# Patient Record
Sex: FEMALE | Race: WHITE | NOT HISPANIC OR LATINO | Employment: OTHER | URBAN - METROPOLITAN AREA
[De-identification: names, ages, dates, MRNs, and addresses within clinical notes are randomized per-mention and may not be internally consistent; named-entity substitution may affect disease eponyms.]

---

## 2023-04-14 DIAGNOSIS — I10 HYPERTENSION, UNSPECIFIED TYPE: Primary | ICD-10-CM

## 2023-04-14 RX ORDER — OLMESARTAN MEDOXOMIL 20 MG/1
TABLET ORAL
Qty: 30 TABLET | Refills: 1 | Status: SHIPPED
Start: 2023-04-14 | End: 2023-05-16

## 2023-05-11 ENCOUNTER — OFFICE VISIT (OUTPATIENT)
Dept: PRIMARY CARE | Facility: CLINIC | Age: 63
End: 2023-05-11
Payer: MEDICARE

## 2023-05-11 VITALS
SYSTOLIC BLOOD PRESSURE: 128 MMHG | TEMPERATURE: 97.8 F | OXYGEN SATURATION: 98 % | BODY MASS INDEX: 33.79 KG/M2 | WEIGHT: 173 LBS | DIASTOLIC BLOOD PRESSURE: 72 MMHG | HEART RATE: 76 BPM

## 2023-05-11 DIAGNOSIS — I10 ESSENTIAL HYPERTENSION: ICD-10-CM

## 2023-05-11 DIAGNOSIS — M25.561 ACUTE PAIN OF RIGHT KNEE: ICD-10-CM

## 2023-05-11 DIAGNOSIS — E11.9 TYPE 2 DIABETES MELLITUS WITHOUT COMPLICATION, WITHOUT LONG-TERM CURRENT USE OF INSULIN (MULTI): Primary | ICD-10-CM

## 2023-05-11 DIAGNOSIS — F41.9 ANXIETY DISORDER, UNSPECIFIED TYPE: ICD-10-CM

## 2023-05-11 DIAGNOSIS — E55.9 VITAMIN D DEFICIENCY: ICD-10-CM

## 2023-05-11 PROBLEM — R11.0 NAUSEA: Status: ACTIVE | Noted: 2023-05-11

## 2023-05-11 PROBLEM — R42 VERTIGO: Status: ACTIVE | Noted: 2023-05-11

## 2023-05-11 PROBLEM — M54.2 NECK PAIN: Status: ACTIVE | Noted: 2023-05-11

## 2023-05-11 PROBLEM — L30.9 DERMATITIS: Status: ACTIVE | Noted: 2023-05-11

## 2023-05-11 PROBLEM — H52.03 HYPEROPIA OF BOTH EYES: Status: ACTIVE | Noted: 2023-05-11

## 2023-05-11 PROBLEM — N95.1 FEMALE CLIMACTERIC STATE: Status: ACTIVE | Noted: 2023-05-11

## 2023-05-11 PROBLEM — R53.83 FATIGUE: Status: ACTIVE | Noted: 2023-05-11

## 2023-05-11 PROBLEM — R10.2 PELVIC PAIN IN FEMALE: Status: ACTIVE | Noted: 2023-05-11

## 2023-05-11 PROBLEM — R00.2 PALPITATIONS: Status: ACTIVE | Noted: 2023-05-11

## 2023-05-11 PROBLEM — R35.0 URINATION FREQUENCY: Status: ACTIVE | Noted: 2023-05-11

## 2023-05-11 PROBLEM — H43.811 PVD (POSTERIOR VITREOUS DETACHMENT), RIGHT EYE: Status: ACTIVE | Noted: 2023-05-11

## 2023-05-11 PROBLEM — K62.5 RECTAL BLEEDING: Status: ACTIVE | Noted: 2023-05-11

## 2023-05-11 PROBLEM — R61 NIGHT SWEATS: Status: ACTIVE | Noted: 2023-05-11

## 2023-05-11 PROBLEM — R22.0 FACIAL SWELLING: Status: ACTIVE | Noted: 2023-05-11

## 2023-05-11 PROBLEM — K76.89 HEPATIC CYST: Status: ACTIVE | Noted: 2023-05-11

## 2023-05-11 PROBLEM — R93.429 ABNORMAL CT SCAN, KIDNEY: Status: ACTIVE | Noted: 2023-05-11

## 2023-05-11 PROBLEM — L23.7 POISON IVY DERMATITIS: Status: ACTIVE | Noted: 2023-05-11

## 2023-05-11 PROBLEM — M79.609 LIMB PAIN: Status: ACTIVE | Noted: 2023-05-11

## 2023-05-11 PROBLEM — H81.10 BPPV (BENIGN PAROXYSMAL POSITIONAL VERTIGO): Status: ACTIVE | Noted: 2023-05-11

## 2023-05-11 PROBLEM — J01.90 ACUTE SINUSITIS: Status: ACTIVE | Noted: 2023-05-11

## 2023-05-11 PROBLEM — H25.13 CATARACT, NUCLEAR SCLEROTIC, BOTH EYES: Status: ACTIVE | Noted: 2023-05-11

## 2023-05-11 PROBLEM — R42 DIZZINESS: Status: ACTIVE | Noted: 2023-05-11

## 2023-05-11 PROBLEM — M47.812 CERVICAL SPONDYLOSIS: Status: ACTIVE | Noted: 2023-05-11

## 2023-05-11 PROBLEM — E78.5 HYPERLIPIDEMIA: Status: ACTIVE | Noted: 2023-05-11

## 2023-05-11 PROBLEM — R10.32 ABDOMINAL PAIN, LLQ: Status: ACTIVE | Noted: 2023-05-11

## 2023-05-11 PROBLEM — B37.31 YEAST INFECTION OF THE VAGINA: Status: ACTIVE | Noted: 2023-05-11

## 2023-05-11 PROBLEM — M25.569 JOINT PAIN, KNEE: Status: ACTIVE | Noted: 2023-05-11

## 2023-05-11 PROBLEM — S13.9XXA NECK SPRAIN: Status: ACTIVE | Noted: 2023-05-11

## 2023-05-11 PROBLEM — R93.2 ABNORMAL CT OF LIVER: Status: ACTIVE | Noted: 2023-05-11

## 2023-05-11 PROBLEM — F32.A DEPRESSION: Status: ACTIVE | Noted: 2023-05-11

## 2023-05-11 PROCEDURE — 4010F ACE/ARB THERAPY RXD/TAKEN: CPT | Performed by: STUDENT IN AN ORGANIZED HEALTH CARE EDUCATION/TRAINING PROGRAM

## 2023-05-11 PROCEDURE — 3074F SYST BP LT 130 MM HG: CPT | Performed by: STUDENT IN AN ORGANIZED HEALTH CARE EDUCATION/TRAINING PROGRAM

## 2023-05-11 PROCEDURE — 3078F DIAST BP <80 MM HG: CPT | Performed by: STUDENT IN AN ORGANIZED HEALTH CARE EDUCATION/TRAINING PROGRAM

## 2023-05-11 PROCEDURE — 99214 OFFICE O/P EST MOD 30 MIN: CPT | Performed by: STUDENT IN AN ORGANIZED HEALTH CARE EDUCATION/TRAINING PROGRAM

## 2023-05-11 PROCEDURE — 1036F TOBACCO NON-USER: CPT | Performed by: STUDENT IN AN ORGANIZED HEALTH CARE EDUCATION/TRAINING PROGRAM

## 2023-05-11 PROCEDURE — 3052F HG A1C>EQUAL 8.0%<EQUAL 9.0%: CPT | Performed by: STUDENT IN AN ORGANIZED HEALTH CARE EDUCATION/TRAINING PROGRAM

## 2023-05-11 RX ORDER — HYDRALAZINE HYDROCHLORIDE 25 MG/1
TABLET, FILM COATED ORAL
COMMUNITY
Start: 2020-03-17

## 2023-05-11 RX ORDER — MICONAZOLE NITRATE 2 %
CREAM WITH APPLICATOR VAGINAL
COMMUNITY
Start: 2022-06-23

## 2023-05-11 RX ORDER — MECLIZINE HYDROCHLORIDE 25 MG/1
1 TABLET ORAL 2 TIMES DAILY PRN
COMMUNITY
Start: 2018-03-23 | End: 2023-07-25 | Stop reason: SDUPTHER

## 2023-05-11 RX ORDER — LISINOPRIL 20 MG/1
1 TABLET ORAL DAILY
COMMUNITY
End: 2023-05-11 | Stop reason: SINTOL

## 2023-05-11 RX ORDER — ATORVASTATIN CALCIUM 40 MG/1
1 TABLET, FILM COATED ORAL NIGHTLY
COMMUNITY
Start: 2022-05-03 | End: 2023-05-12 | Stop reason: SDUPTHER

## 2023-05-11 RX ORDER — ALPRAZOLAM 0.5 MG/1
1 TABLET ORAL 3 TIMES DAILY PRN
COMMUNITY
Start: 2013-08-22 | End: 2023-05-12 | Stop reason: SDUPTHER

## 2023-05-11 RX ORDER — AMLODIPINE BESYLATE 10 MG/1
1 TABLET ORAL DAILY
COMMUNITY
Start: 2016-06-15 | End: 2023-05-16 | Stop reason: SDUPTHER

## 2023-05-11 RX ORDER — BLOOD SUGAR DIAGNOSTIC
STRIP MISCELLANEOUS
COMMUNITY
Start: 2013-10-15

## 2023-05-11 RX ORDER — SCOLOPAMINE TRANSDERMAL SYSTEM 1 MG/1
PATCH, EXTENDED RELEASE TRANSDERMAL
COMMUNITY
Start: 2022-05-11 | End: 2023-12-01 | Stop reason: SDUPTHER

## 2023-05-11 RX ORDER — CITALOPRAM 20 MG/1
1 TABLET, FILM COATED ORAL DAILY
COMMUNITY
Start: 2013-07-11 | End: 2023-05-16 | Stop reason: SDUPTHER

## 2023-05-11 RX ORDER — TRIAMCINOLONE ACETONIDE 1 MG/G
OINTMENT TOPICAL 2 TIMES DAILY
COMMUNITY
Start: 2022-08-01

## 2023-05-11 RX ORDER — CAMPHOR
SPIRIT TOPICAL
COMMUNITY
Start: 2022-08-01

## 2023-05-11 RX ORDER — GLIPIZIDE 5 MG/1
1 TABLET ORAL 2 TIMES DAILY
COMMUNITY
Start: 2018-09-03 | End: 2023-05-12 | Stop reason: SDUPTHER

## 2023-05-11 RX ORDER — ERGOCALCIFEROL 1.25 MG/1
1 CAPSULE ORAL
COMMUNITY
Start: 2014-11-25 | End: 2023-05-12 | Stop reason: SDUPTHER

## 2023-05-11 RX ORDER — IBUPROFEN 600 MG/1
TABLET ORAL
COMMUNITY
End: 2023-05-11 | Stop reason: SINTOL

## 2023-05-11 RX ORDER — METFORMIN HYDROCHLORIDE 500 MG/1
1 TABLET ORAL 2 TIMES DAILY
COMMUNITY
Start: 2013-08-22 | End: 2023-05-12 | Stop reason: SDUPTHER

## 2023-05-11 RX ORDER — PREDNISONE 20 MG/1
40 TABLET ORAL DAILY
Qty: 10 TABLET | Refills: 0 | Status: SHIPPED | OUTPATIENT
Start: 2023-05-11 | End: 2023-05-16

## 2023-05-11 ASSESSMENT — PATIENT HEALTH QUESTIONNAIRE - PHQ9
SUM OF ALL RESPONSES TO PHQ9 QUESTIONS 1 AND 2: 0
1. LITTLE INTEREST OR PLEASURE IN DOING THINGS: NOT AT ALL
2. FEELING DOWN, DEPRESSED OR HOPELESS: NOT AT ALL

## 2023-05-11 ASSESSMENT — COLUMBIA-SUICIDE SEVERITY RATING SCALE - C-SSRS
6. HAVE YOU EVER DONE ANYTHING, STARTED TO DO ANYTHING, OR PREPARED TO DO ANYTHING TO END YOUR LIFE?: NO
2. HAVE YOU ACTUALLY HAD ANY THOUGHTS OF KILLING YOURSELF?: NO
1. IN THE PAST MONTH, HAVE YOU WISHED YOU WERE DEAD OR WISHED YOU COULD GO TO SLEEP AND NOT WAKE UP?: NO

## 2023-05-11 ASSESSMENT — ENCOUNTER SYMPTOMS
LOSS OF SENSATION IN FEET: 0
OCCASIONAL FEELINGS OF UNSTEADINESS: 0
DEPRESSION: 0

## 2023-05-11 NOTE — PROGRESS NOTES
HPI: 62-year-old female presenting for 3-month follow-up.     HTN  Tolerating current regimen well.  Is taking the olmesartan she was started on, but states she stopped taking the amlodipine, and blood pressure has been good, systolic 120-130.    DMII  Stable, above goal.  Started taking Jardiance, has been getting yeast infections.  Reports when she discontinues the medication though, her sugars do go up.    Anxiety  Stable on current regimen, needs refills.    Right knee pain  Has been going on for approximately 2 months, insidious onset, no trauma.  Pain with getting up from the chair mostly, mild swelling.  Has taken some Motrin, which does help.    Denies HA, changes in vision, chest pain, SOB/TAYLOR, palpitations, N/V/D/C, dysuria/hematuria, hematochezia/melena, paresthesias, LE edema.    12 point ROS reviewed and negative other than as stated in HPI    General: Alert, oriented, pleasant, in no acute distress  HEENT:    Head: normocephalic, atraumatic;    eyes: EOMI, no scleral icterus;   Neck: soft, supple, non-tender, no masses appreciated  CV: Heart with regular rate and rhythm, normal S1/S2, no murmurs  Lungs: CTAB without wheezing, rhonchi or rales; good respiratory effort, no increased work of breathing  Neuro: Cranial nerves grossly intact; alert and oriented  Psych: Appropriate mood and affect  MSK: Right knee borderline effusion, tenderness on the Pez anserine tendon    1. HTN  -At goal in office  -Continue olmesartan 20 mg daily    2. DMII  -Last A1c 8.2  -Currently on metformin 1000 mg twice daily, glipizide 5 mg daily, Jardiance 10 mg daily  -We will discontinue Jardiance secondary to chronic vaginal candidiasis, would like to start GLP-1, hopefully get off sulfonylurea at that point, pharmacist will reach out to work with insurance    3. Anxiety  -Refill alprazolam 0.5 mg 3 times daily as needed  -CSA signed    4. R knee pain  -High suspicion for Pez anserine bursitis, exercise PDF given  -Plain  film  -Short dose of prednisone    F/U 3 months    Chris D'Amico, DO

## 2023-05-12 ENCOUNTER — LAB (OUTPATIENT)
Dept: LAB | Facility: LAB | Age: 63
End: 2023-05-12
Payer: MEDICARE

## 2023-05-12 DIAGNOSIS — E78.00 HYPERCHOLESTEROLEMIA: ICD-10-CM

## 2023-05-12 DIAGNOSIS — F41.9 ANXIETY DISORDER, UNSPECIFIED TYPE: ICD-10-CM

## 2023-05-12 DIAGNOSIS — E11.9 TYPE 2 DIABETES MELLITUS WITHOUT COMPLICATION, WITHOUT LONG-TERM CURRENT USE OF INSULIN (MULTI): ICD-10-CM

## 2023-05-12 DIAGNOSIS — F41.9 ANXIETY: ICD-10-CM

## 2023-05-12 DIAGNOSIS — E55.9 VITAMIN D DEFICIENCY: ICD-10-CM

## 2023-05-12 DIAGNOSIS — M25.561 ACUTE PAIN OF RIGHT KNEE: ICD-10-CM

## 2023-05-12 DIAGNOSIS — E11.69 TYPE 2 DIABETES MELLITUS WITH OTHER SPECIFIED COMPLICATION, UNSPECIFIED WHETHER LONG TERM INSULIN USE (MULTI): ICD-10-CM

## 2023-05-12 DIAGNOSIS — I10 ESSENTIAL HYPERTENSION: ICD-10-CM

## 2023-05-12 LAB
ALANINE AMINOTRANSFERASE (SGPT) (U/L) IN SER/PLAS: 23 U/L (ref 7–45)
ALBUMIN (G/DL) IN SER/PLAS: 4.1 G/DL (ref 3.4–5)
ALKALINE PHOSPHATASE (U/L) IN SER/PLAS: 78 U/L (ref 33–136)
ANION GAP IN SER/PLAS: 14 MMOL/L (ref 10–20)
ASPARTATE AMINOTRANSFERASE (SGOT) (U/L) IN SER/PLAS: 13 U/L (ref 9–39)
BILIRUBIN TOTAL (MG/DL) IN SER/PLAS: 0.7 MG/DL (ref 0–1.2)
CALCIDIOL (25 OH VITAMIN D3) (NG/ML) IN SER/PLAS: 62 NG/ML
CALCIUM (MG/DL) IN SER/PLAS: 9.4 MG/DL (ref 8.6–10.6)
CARBON DIOXIDE, TOTAL (MMOL/L) IN SER/PLAS: 25 MMOL/L (ref 21–32)
CHLORIDE (MMOL/L) IN SER/PLAS: 107 MMOL/L (ref 98–107)
CHOLESTEROL (MG/DL) IN SER/PLAS: 157 MG/DL (ref 0–199)
CHOLESTEROL IN HDL (MG/DL) IN SER/PLAS: 42.5 MG/DL
CHOLESTEROL/HDL RATIO: 3.7
CREATININE (MG/DL) IN SER/PLAS: 0.57 MG/DL (ref 0.5–1.05)
ERYTHROCYTE DISTRIBUTION WIDTH (RATIO) BY AUTOMATED COUNT: 13.1 % (ref 11.5–14.5)
ERYTHROCYTE MEAN CORPUSCULAR HEMOGLOBIN CONCENTRATION (G/DL) BY AUTOMATED: 32.5 G/DL (ref 32–36)
ERYTHROCYTE MEAN CORPUSCULAR VOLUME (FL) BY AUTOMATED COUNT: 88 FL (ref 80–100)
ERYTHROCYTES (10*6/UL) IN BLOOD BY AUTOMATED COUNT: 4.84 X10E12/L (ref 4–5.2)
ESTIMATED AVERAGE GLUCOSE FOR HBA1C: 192 MG/DL
GFR FEMALE: >90 ML/MIN/1.73M2
GLUCOSE (MG/DL) IN SER/PLAS: 175 MG/DL (ref 74–99)
HEMATOCRIT (%) IN BLOOD BY AUTOMATED COUNT: 42.5 % (ref 36–46)
HEMOGLOBIN (G/DL) IN BLOOD: 13.8 G/DL (ref 12–16)
HEMOGLOBIN A1C/HEMOGLOBIN TOTAL IN BLOOD: 8.3 %
LDL: 79 MG/DL (ref 0–99)
LEUKOCYTES (10*3/UL) IN BLOOD BY AUTOMATED COUNT: 8.1 X10E9/L (ref 4.4–11.3)
NRBC (PER 100 WBCS) BY AUTOMATED COUNT: 0 /100 WBC (ref 0–0)
PLATELETS (10*3/UL) IN BLOOD AUTOMATED COUNT: 270 X10E9/L (ref 150–450)
POTASSIUM (MMOL/L) IN SER/PLAS: 4.9 MMOL/L (ref 3.5–5.3)
PROTEIN TOTAL: 6.7 G/DL (ref 6.4–8.2)
SODIUM (MMOL/L) IN SER/PLAS: 141 MMOL/L (ref 136–145)
THYROTROPIN (MIU/L) IN SER/PLAS BY DETECTION LIMIT <= 0.05 MIU/L: 1.52 MIU/L (ref 0.44–3.98)
TRIGLYCERIDE (MG/DL) IN SER/PLAS: 178 MG/DL (ref 0–149)
UREA NITROGEN (MG/DL) IN SER/PLAS: 18 MG/DL (ref 6–23)
VLDL: 36 MG/DL (ref 0–40)

## 2023-05-12 PROCEDURE — 84443 ASSAY THYROID STIM HORMONE: CPT

## 2023-05-12 PROCEDURE — 80053 COMPREHEN METABOLIC PANEL: CPT

## 2023-05-12 PROCEDURE — 36415 COLL VENOUS BLD VENIPUNCTURE: CPT

## 2023-05-12 PROCEDURE — 82306 VITAMIN D 25 HYDROXY: CPT

## 2023-05-12 PROCEDURE — 85027 COMPLETE CBC AUTOMATED: CPT

## 2023-05-12 PROCEDURE — 80061 LIPID PANEL: CPT

## 2023-05-12 PROCEDURE — 83036 HEMOGLOBIN GLYCOSYLATED A1C: CPT

## 2023-05-12 RX ORDER — ATORVASTATIN CALCIUM 40 MG/1
40 TABLET, FILM COATED ORAL NIGHTLY
Qty: 90 TABLET | Refills: 1 | Status: SHIPPED
Start: 2023-05-12 | End: 2024-02-22 | Stop reason: SDUPTHER

## 2023-05-12 RX ORDER — ALPRAZOLAM 0.5 MG/1
0.5 TABLET ORAL 3 TIMES DAILY PRN
Qty: 90 TABLET | Refills: 0 | Status: SHIPPED | OUTPATIENT
Start: 2023-05-12 | End: 2023-08-18 | Stop reason: SDUPTHER

## 2023-05-12 RX ORDER — ERGOCALCIFEROL 1.25 MG/1
1 CAPSULE ORAL
Qty: 12 CAPSULE | Refills: 1 | Status: SHIPPED
Start: 2023-05-12 | End: 2024-03-21 | Stop reason: SDUPTHER

## 2023-05-12 RX ORDER — GLIPIZIDE 5 MG/1
5 TABLET ORAL 2 TIMES DAILY
Qty: 180 TABLET | Refills: 1 | Status: SHIPPED
Start: 2023-05-12 | End: 2023-08-10 | Stop reason: ALTCHOICE

## 2023-05-12 RX ORDER — METFORMIN HYDROCHLORIDE 500 MG/1
500 TABLET ORAL 2 TIMES DAILY
Qty: 180 TABLET | Refills: 1 | Status: SHIPPED
Start: 2023-05-12 | End: 2024-04-24 | Stop reason: SDUPTHER

## 2023-05-12 NOTE — RESULT ENCOUNTER NOTE
X-ray shows arthritis in the knee with some swelling.  Medication given in office will help temporarily with bringing down the inflammation.  Likely formal physical therapy would be the next best treatment option.

## 2023-05-15 ENCOUNTER — TELEMEDICINE (OUTPATIENT)
Dept: PHARMACY | Facility: HOSPITAL | Age: 63
End: 2023-05-15

## 2023-05-15 DIAGNOSIS — E11.9 DIABETES MELLITUS TYPE 2 WITHOUT RETINOPATHY (MULTI): Primary | ICD-10-CM

## 2023-05-15 DIAGNOSIS — E11.9 DIABETES MELLITUS TYPE 2 WITHOUT RETINOPATHY (MULTI): ICD-10-CM

## 2023-05-15 RX ORDER — DULAGLUTIDE 0.75 MG/.5ML
0.75 INJECTION, SOLUTION SUBCUTANEOUS
Qty: 2 ML | Refills: 1 | Status: SHIPPED
Start: 2023-05-15 | End: 2023-06-08 | Stop reason: SDUPTHER

## 2023-05-15 NOTE — PROGRESS NOTES
Pharmacist Clinic: Diabetes Management  Keith Fregoso is a 62 y.o. female was referred to Clinical Pharmacy Team for diabetes management.     Referring Provider: Christopher D'Amico, DO     HISTORY OF PRESENT ILLNESS  Approximate Date of Diagnosis: approx 20 years ago  Diet: patient states she knows she does not eat healthy, she needs to work on it   Exercise Routine: minimal given her leg problems     LAB REVIEW   Glucose (mg/dL)   Date Value   05/12/2023 175 (H)   02/08/2023 150 (H)   11/08/2022 192 (H)     Hemoglobin A1C (%)   Date Value   05/12/2023 8.3 (A)   02/08/2023 8.2 (A)   11/08/2022 8.7 (A)     Bicarbonate (mmol/L)   Date Value   05/12/2023 25   02/08/2023 31   11/08/2022 28     Urea Nitrogen (mg/dL)   Date Value   05/12/2023 18   02/08/2023 18   11/08/2022 12     Creatinine (mg/dL)   Date Value   05/12/2023 0.57   02/08/2023 0.59   11/08/2022 0.54     Lab Results   Component Value Date    CHOL 157 05/12/2023    CHOL 165 02/08/2023    CHOL 183 11/08/2022     Lab Results   Component Value Date    HDL 42.5 05/12/2023    HDL 53.9 02/08/2023    HDL 49.4 11/08/2022     No results found for: LDLCALC  Lab Results   Component Value Date    TRIG 178 (H) 05/12/2023    TRIG 110 02/08/2023    TRIG 158 (H) 11/08/2022     DIABETES ASSESSMENT    CURRENT PHARMACOTHERAPY  - januvia 100 mg one daily   - metformin 500 mg twice daily   - glipizide 5 mg twice daily     SECONDARY PREVENTION  - Statin? Yes  - ACE-I/ARB? Yes, patient appears to be non-adherent     HISTORICAL PHARMACOTHERAPY  - SGLT2i: recurrent yeast infections     SMBG MEASUREMENTS: AM readings around 140-150     Patient does not report symptoms of hypoglycemia. Patient denies dizziness and sweating.  Patient does not report symptoms of hyperglycemia. Patient denies excessive thirst and excessive urination.    RECOMMENDATIONS/PLAN  1. Patients diabetes is poorly controlled with most recent A1c of 8.3 % (goal < 7 %).   - Continue all meds under the  continuation of care with the referring provider and clinical pharmacy team.  - Initiate trulicity 0.75 mg under the skin once weekly.   - Future consideration to discontinue januvia pending tolerability of trulicity.     2. Education: went over trulicity (MOA, side effects, administration, expectations)  - Patients daughter is a nurse who will help administer the first few injections    Clinical Pharmacist follow up: one week     Thank you,  Alejandra Espinal, PharmD    Verbal consent to manage patient's drug therapy was obtained from the patient. They were informed they may decline to participate or withdraw from participation in pharmacy services at any time.

## 2023-05-15 NOTE — RESULT ENCOUNTER NOTE
Triglycerides borderline elevated, likely secondary to elevated blood sugars.    Hemoglobin A1c at 8.3, stable from last few draws.  I think it will be very beneficial to start the weekly injectable medications, should continue to follow with pharmacist.    Remaining labs unremarkable.

## 2023-05-16 DIAGNOSIS — F41.9 ANXIETY: ICD-10-CM

## 2023-05-16 DIAGNOSIS — I15.9 SECONDARY HYPERTENSION: ICD-10-CM

## 2023-05-16 RX ORDER — CITALOPRAM 20 MG/1
20 TABLET, FILM COATED ORAL DAILY
Qty: 90 TABLET | Refills: 1 | Status: SHIPPED | OUTPATIENT
Start: 2023-05-16

## 2023-05-16 RX ORDER — AMLODIPINE BESYLATE 10 MG/1
10 TABLET ORAL DAILY
Qty: 90 TABLET | Refills: 1 | Status: SHIPPED
Start: 2023-05-16 | End: 2023-12-11 | Stop reason: SDUPTHER

## 2023-05-22 ENCOUNTER — APPOINTMENT (OUTPATIENT)
Dept: PHARMACY | Facility: HOSPITAL | Age: 63
End: 2023-05-22

## 2023-05-24 ENCOUNTER — TELEMEDICINE (OUTPATIENT)
Dept: PHARMACY | Facility: HOSPITAL | Age: 63
End: 2023-05-24

## 2023-05-24 DIAGNOSIS — E11.9 DIABETES MELLITUS TYPE 2 WITHOUT RETINOPATHY (MULTI): Primary | ICD-10-CM

## 2023-05-24 NOTE — PROGRESS NOTES
Pharmacist Clinic: Diabetes Management  Keith Fregoso is a 62 y.o. female was referred to Clinical Pharmacy Team for diabetes management. At last visit, trulicity was initiated.     Referring Provider: Christopher D'Amico, DO     HISTORY OF PRESENT ILLNESS  Approximate Date of Diagnosis: approx 20 years ago  Diet: patient states she knows she does not eat healthy, she needs to work on it   Exercise Routine: minimal given her leg problems     LAB REVIEW   Glucose (mg/dL)   Date Value   05/12/2023 175 (H)   02/08/2023 150 (H)   11/08/2022 192 (H)     Hemoglobin A1C (%)   Date Value   05/12/2023 8.3 (A)   02/08/2023 8.2 (A)   11/08/2022 8.7 (A)     Bicarbonate (mmol/L)   Date Value   05/12/2023 25   02/08/2023 31   11/08/2022 28     Urea Nitrogen (mg/dL)   Date Value   05/12/2023 18   02/08/2023 18   11/08/2022 12     Creatinine (mg/dL)   Date Value   05/12/2023 0.57   02/08/2023 0.59   11/08/2022 0.54     Lab Results   Component Value Date    CHOL 157 05/12/2023    CHOL 165 02/08/2023    CHOL 183 11/08/2022     Lab Results   Component Value Date    HDL 42.5 05/12/2023    HDL 53.9 02/08/2023    HDL 49.4 11/08/2022     No results found for: LDLCALC  Lab Results   Component Value Date    TRIG 178 (H) 05/12/2023    TRIG 110 02/08/2023    TRIG 158 (H) 11/08/2022     DIABETES ASSESSMENT    CURRENT PHARMACOTHERAPY  - januvia 100 mg one daily   - metformin 500 mg twice daily   - glipizide 5 mg twice daily   - trulicity 0.75 mg under the skin once weekly, first dose given on 5/21    SECONDARY PREVENTION  - Statin? Yes  - ACE-I/ARB? Yes, patient appears to be non-adherent     HISTORICAL PHARMACOTHERAPY  - SGLT2i: recurrent yeast infections     SMBG MEASUREMENTS: AM readings around 140-150     Patient does not report symptoms of hypoglycemia. Patient denies dizziness and sweating.  Patient does not report symptoms of hyperglycemia. Patient denies excessive thirst and excessive urination.  Patient is tolerating the medication  without any side effects. She has noticed she has a decreased appetite on the medication.     RECOMMENDATIONS/PLAN  1. Patients diabetes is poorly controlled with most recent A1c of 8.3 % (goal < 7 %).   - Continue all meds under the continuation of care with the referring provider and clinical pharmacy team.  - Initiate trulicity 0.75 mg under the skin once weekly.   - Future consideration to discontinue januvia pending tolerability of trulicity.     Clinical Pharmacist follow up: three weeks    Thank you,  Alejandra Espinal, PharmD    Verbal consent to manage patient's drug therapy was obtained from the patient. They were informed they may decline to participate or withdraw from participation in pharmacy services at any time.

## 2023-06-08 DIAGNOSIS — E11.9 DIABETES MELLITUS TYPE 2 WITHOUT RETINOPATHY (MULTI): ICD-10-CM

## 2023-06-08 DIAGNOSIS — K21.9 GASTROESOPHAGEAL REFLUX DISEASE, UNSPECIFIED WHETHER ESOPHAGITIS PRESENT: ICD-10-CM

## 2023-06-08 RX ORDER — DULAGLUTIDE 0.75 MG/.5ML
0.75 INJECTION, SOLUTION SUBCUTANEOUS
Qty: 3 ML | Refills: 2 | Status: SHIPPED | OUTPATIENT
Start: 2023-06-08 | End: 2023-08-14 | Stop reason: ALTCHOICE

## 2023-06-08 RX ORDER — OLMESARTAN MEDOXOMIL 20 MG/1
20 TABLET ORAL DAILY
COMMUNITY
End: 2023-06-08 | Stop reason: SDUPTHER

## 2023-06-08 RX ORDER — OLMESARTAN MEDOXOMIL 20 MG/1
20 TABLET ORAL DAILY
Qty: 90 TABLET | Refills: 3 | Status: SHIPPED | OUTPATIENT
Start: 2023-06-08 | End: 2023-12-11 | Stop reason: SDUPTHER

## 2023-06-12 ENCOUNTER — TELEMEDICINE (OUTPATIENT)
Dept: PHARMACY | Facility: HOSPITAL | Age: 63
End: 2023-06-12

## 2023-06-12 DIAGNOSIS — E11.9 DIABETES MELLITUS TYPE 2 WITHOUT RETINOPATHY (MULTI): Primary | ICD-10-CM

## 2023-06-12 NOTE — PROGRESS NOTES
"Pharmacist Clinic: Diabetes Management  Keith Fregoso is a 62 y.o. female was referred to Clinical Pharmacy Team for diabetes management. At last visit, no medication changes were made.    Referring Provider: Christopher D'Amico, DO     HISTORY OF PRESENT ILLNESS  Approximate Date of Diagnosis: approx 20 years ago  Diet: patient states she knows she does not eat healthy, she needs to work on it   Exercise Routine: minimal given her leg problems     LAB REVIEW   Glucose (mg/dL)   Date Value   05/12/2023 175 (H)   02/08/2023 150 (H)   11/08/2022 192 (H)     Hemoglobin A1C (%)   Date Value   05/12/2023 8.3 (A)   02/08/2023 8.2 (A)   11/08/2022 8.7 (A)     Bicarbonate (mmol/L)   Date Value   05/12/2023 25   02/08/2023 31   11/08/2022 28     Urea Nitrogen (mg/dL)   Date Value   05/12/2023 18   02/08/2023 18   11/08/2022 12     Creatinine (mg/dL)   Date Value   05/12/2023 0.57   02/08/2023 0.59   11/08/2022 0.54     Lab Results   Component Value Date    CHOL 157 05/12/2023    CHOL 165 02/08/2023    CHOL 183 11/08/2022     Lab Results   Component Value Date    HDL 42.5 05/12/2023    HDL 53.9 02/08/2023    HDL 49.4 11/08/2022     No results found for: \"LDLCALC\"  Lab Results   Component Value Date    TRIG 178 (H) 05/12/2023    TRIG 110 02/08/2023    TRIG 158 (H) 11/08/2022     DIABETES ASSESSMENT    CURRENT PHARMACOTHERAPY  - januvia 100 mg one daily   - metformin 500 mg twice daily   - glipizide 5 mg twice daily   - trulicity 0.75 mg under the skin once weekly, first dose given on 5/21    SECONDARY PREVENTION  - Statin? Yes  - ACE-I/ARB? Yes, patient appears to be non-adherent     HISTORICAL PHARMACOTHERAPY  - SGLT2i: recurrent yeast infections     SMBG MEASUREMENTS: AM readings around 150-155    Patient is tolerating the medication without any side effects. She has noticed she has a decreased appetite on the medication. She states her insurance company sent her 3 months of medication, follow up monthly to ensure she is " not without meds. Plan to titrate to max tolerated dosage.     RECOMMENDATIONS/PLAN  1. Patients diabetes is poorly controlled with most recent A1c of 8.3 % (goal < 7 %).   - Continue all meds under the continuation of care with the referring provider and clinical pharmacy team. Discontinue januvia d/t duplicate MOA with trulicity.     Clinical Pharmacist follow up: one month    Thank you,  Alejandra Espinal, PharmD    Verbal consent to manage patient's drug therapy was obtained from the patient. They were informed they may decline to participate or withdraw from participation in pharmacy services at any time.

## 2023-06-28 ENCOUNTER — TELEMEDICINE (OUTPATIENT)
Dept: PHARMACY | Facility: HOSPITAL | Age: 63
End: 2023-06-28

## 2023-06-28 DIAGNOSIS — E11.9 DIABETES MELLITUS TYPE 2 WITHOUT RETINOPATHY (MULTI): Primary | ICD-10-CM

## 2023-06-28 NOTE — PROGRESS NOTES
"Pharmacist Clinic: Diabetes Management  Keith Fregoso is a 62 y.o. female was referred to Clinical Pharmacy Team for diabetes management. At last visit, januvia was discontinued.     Since last speaking, patient discontinued her glipizide. Her sister recently started a once weekly injection and stopped her CASTELLON, so she decided to do the same.     Referring Provider: Christopher D'Amico,      HISTORY OF PRESENT ILLNESS  Approximate Date of Diagnosis: approx 20 years ago  Diet: patient states she knows she does not eat healthy, she needs to work on it   Exercise Routine: minimal given her leg problems     LAB REVIEW   Glucose (mg/dL)   Date Value   05/12/2023 175 (H)   02/08/2023 150 (H)   11/08/2022 192 (H)     Hemoglobin A1C (%)   Date Value   05/12/2023 8.3 (A)   02/08/2023 8.2 (A)   11/08/2022 8.7 (A)     Bicarbonate (mmol/L)   Date Value   05/12/2023 25   02/08/2023 31   11/08/2022 28     Urea Nitrogen (mg/dL)   Date Value   05/12/2023 18   02/08/2023 18   11/08/2022 12     Creatinine (mg/dL)   Date Value   05/12/2023 0.57   02/08/2023 0.59   11/08/2022 0.54     Lab Results   Component Value Date    CHOL 157 05/12/2023    CHOL 165 02/08/2023    CHOL 183 11/08/2022     Lab Results   Component Value Date    HDL 42.5 05/12/2023    HDL 53.9 02/08/2023    HDL 49.4 11/08/2022     No results found for: \"LDLCALC\"  Lab Results   Component Value Date    TRIG 178 (H) 05/12/2023    TRIG 110 02/08/2023    TRIG 158 (H) 11/08/2022     DIABETES ASSESSMENT    CURRENT PHARMACOTHERAPY  - metformin 500 mg twice daily   - trulicity 0.75 mg under the skin once weekly (gives shot on sundays at 1:30, patient is stating she has done 2 shots)     SECONDARY PREVENTION  - Statin? Yes  - ACE-I/ARB? Yes, patient appears to be non-adherent     HISTORICAL PHARMACOTHERAPY  - SGLT2i: recurrent yeast infections     SMBG MEASUREMENTS: AM readings around 140-150s    Patient is tolerating the medication without any side effects. She was nauseas " once, thought she was going to throw up, but never threw up. She has 8 injections left.     RECOMMENDATIONS/PLAN  1. Patients diabetes is poorly controlled with most recent A1c of 8.3 % (goal < 7 %).   - Continue all meds under the continuation of care with the referring provider and clinical pharmacy team.   - Follow up in 2 weeks, consider doing 2 injections of 0.75 mg for 1.5 mg dose if BG remains elevated.    Clinical Pharmacist follow up: 2 weeks    Thank you,  Alejandra Espinal, PharmD    Verbal consent to manage patient's drug therapy was obtained from the patient. They were informed they may decline to participate or withdraw from participation in pharmacy services at any time.

## 2023-07-12 ENCOUNTER — APPOINTMENT (OUTPATIENT)
Dept: PHARMACY | Facility: HOSPITAL | Age: 63
End: 2023-07-12

## 2023-07-13 ENCOUNTER — TELEMEDICINE (OUTPATIENT)
Dept: PHARMACY | Facility: HOSPITAL | Age: 63
End: 2023-07-13

## 2023-07-13 DIAGNOSIS — E11.9 DIABETES MELLITUS TYPE 2 WITHOUT RETINOPATHY (MULTI): Primary | ICD-10-CM

## 2023-07-13 NOTE — PROGRESS NOTES
"Pharmacist Clinic: Diabetes Management  Keith Fregoso is a 62 y.o. female was referred to Clinical Pharmacy Team for diabetes management. At last visit, januvia was discontinued.     Since last speaking, patient discontinued her glipizide. Her sister recently started a once weekly injection and stopped her CASTELLON, so she decided to do the same.     Referring Provider: Christopher D'Amico,      HISTORY OF PRESENT ILLNESS  Approximate Date of Diagnosis: approx 20 years ago  Diet: patient states she knows she does not eat healthy, she needs to work on it   Exercise Routine: minimal given her leg problems     LAB REVIEW   Glucose (mg/dL)   Date Value   05/12/2023 175 (H)   02/08/2023 150 (H)   11/08/2022 192 (H)     Hemoglobin A1C (%)   Date Value   05/12/2023 8.3 (A)   02/08/2023 8.2 (A)   11/08/2022 8.7 (A)     Bicarbonate (mmol/L)   Date Value   05/12/2023 25   02/08/2023 31   11/08/2022 28     Urea Nitrogen (mg/dL)   Date Value   05/12/2023 18   02/08/2023 18   11/08/2022 12     Creatinine (mg/dL)   Date Value   05/12/2023 0.57   02/08/2023 0.59   11/08/2022 0.54     Lab Results   Component Value Date    CHOL 157 05/12/2023    CHOL 165 02/08/2023    CHOL 183 11/08/2022     Lab Results   Component Value Date    HDL 42.5 05/12/2023    HDL 53.9 02/08/2023    HDL 49.4 11/08/2022     No results found for: \"LDLCALC\"  Lab Results   Component Value Date    TRIG 178 (H) 05/12/2023    TRIG 110 02/08/2023    TRIG 158 (H) 11/08/2022     DIABETES ASSESSMENT    CURRENT PHARMACOTHERAPY  - metformin 500 mg twice daily   - trulicity 0.75 mg under the skin once weekly (gives shot on sundays at 1:30, patient is stating she has done 2 shots)     SECONDARY PREVENTION  - Statin? Yes  - ACE-I/ARB? Yes, patient appears to be non-adherent     HISTORICAL PHARMACOTHERAPY  - SGLT2i: recurrent yeast infections     SMBG MEASUREMENTS: AM readings around 140-150s    Patient is tolerating the medication without any side effects. Her insurance sent " her a 90 day supply of trulicity, she wants to use what she paid for before increasing the dose.     RECOMMENDATIONS/PLAN  1. Patients diabetes is poorly controlled with most recent A1c of 8.3 % (goal < 7 %).   - Continue all meds under the continuation of care with the referring provider and clinical pharmacy team. Follow up in 7 weeks to increase trulicity dose.     Clinical Pharmacist follow up: 7 weeks    Thank you,  Alejandra Espinal, PharmD    Verbal consent to manage patient's drug therapy was obtained from the patient. They were informed they may decline to participate or withdraw from participation in pharmacy services at any time.

## 2023-07-25 DIAGNOSIS — R42 VERTIGO: ICD-10-CM

## 2023-07-25 RX ORDER — MECLIZINE HYDROCHLORIDE 25 MG/1
25 TABLET ORAL 2 TIMES DAILY PRN
Qty: 30 TABLET | Refills: 2 | Status: SHIPPED | OUTPATIENT
Start: 2023-07-25 | End: 2023-09-14

## 2023-08-10 ENCOUNTER — APPOINTMENT (OUTPATIENT)
Dept: PRIMARY CARE | Facility: CLINIC | Age: 63
End: 2023-08-10
Payer: MEDICARE

## 2023-08-10 ENCOUNTER — OFFICE VISIT (OUTPATIENT)
Dept: PRIMARY CARE | Facility: CLINIC | Age: 63
End: 2023-08-10
Payer: MEDICARE

## 2023-08-10 VITALS
HEART RATE: 78 BPM | WEIGHT: 171 LBS | HEIGHT: 60 IN | SYSTOLIC BLOOD PRESSURE: 106 MMHG | DIASTOLIC BLOOD PRESSURE: 68 MMHG | BODY MASS INDEX: 33.57 KG/M2 | OXYGEN SATURATION: 96 %

## 2023-08-10 DIAGNOSIS — I10 ESSENTIAL HYPERTENSION: ICD-10-CM

## 2023-08-10 DIAGNOSIS — E11.9 TYPE 2 DIABETES MELLITUS WITHOUT COMPLICATION, WITHOUT LONG-TERM CURRENT USE OF INSULIN (MULTI): ICD-10-CM

## 2023-08-10 DIAGNOSIS — E55.9 VITAMIN D DEFICIENCY: Primary | ICD-10-CM

## 2023-08-10 DIAGNOSIS — M25.561 ACUTE PAIN OF RIGHT KNEE: ICD-10-CM

## 2023-08-10 DIAGNOSIS — F41.9 ANXIETY DISORDER, UNSPECIFIED TYPE: ICD-10-CM

## 2023-08-10 PROCEDURE — 1036F TOBACCO NON-USER: CPT | Performed by: STUDENT IN AN ORGANIZED HEALTH CARE EDUCATION/TRAINING PROGRAM

## 2023-08-10 PROCEDURE — 3074F SYST BP LT 130 MM HG: CPT | Performed by: STUDENT IN AN ORGANIZED HEALTH CARE EDUCATION/TRAINING PROGRAM

## 2023-08-10 PROCEDURE — 4010F ACE/ARB THERAPY RXD/TAKEN: CPT | Performed by: STUDENT IN AN ORGANIZED HEALTH CARE EDUCATION/TRAINING PROGRAM

## 2023-08-10 PROCEDURE — 3078F DIAST BP <80 MM HG: CPT | Performed by: STUDENT IN AN ORGANIZED HEALTH CARE EDUCATION/TRAINING PROGRAM

## 2023-08-10 PROCEDURE — 3052F HG A1C>EQUAL 8.0%<EQUAL 9.0%: CPT | Performed by: STUDENT IN AN ORGANIZED HEALTH CARE EDUCATION/TRAINING PROGRAM

## 2023-08-10 PROCEDURE — 99214 OFFICE O/P EST MOD 30 MIN: CPT | Performed by: STUDENT IN AN ORGANIZED HEALTH CARE EDUCATION/TRAINING PROGRAM

## 2023-08-10 ASSESSMENT — COLUMBIA-SUICIDE SEVERITY RATING SCALE - C-SSRS
1. IN THE PAST MONTH, HAVE YOU WISHED YOU WERE DEAD OR WISHED YOU COULD GO TO SLEEP AND NOT WAKE UP?: NO
6. HAVE YOU EVER DONE ANYTHING, STARTED TO DO ANYTHING, OR PREPARED TO DO ANYTHING TO END YOUR LIFE?: NO
2. HAVE YOU ACTUALLY HAD ANY THOUGHTS OF KILLING YOURSELF?: NO

## 2023-08-10 ASSESSMENT — ENCOUNTER SYMPTOMS
OCCASIONAL FEELINGS OF UNSTEADINESS: 0
LOSS OF SENSATION IN FEET: 0
DEPRESSION: 0

## 2023-08-10 NOTE — PROGRESS NOTES
62-year-old female presenting for 3-month follow-up on chronic conditions:    HTN  Stable, tolerating current regimen well.    DMII  Stable, tolerating current regimen well.  Following with [facility] pharmacist.    Anxiety  Stable on current regimen.    Right knee pain  Some improvement with home exercises and prednisone burst.  Still having clicking and pain with walking.    12 point ROS reviewed and negative other than as stated in HPI    General: Alert, oriented, pleasant, in no acute distress  HEENT:    Head: normocephalic, atraumatic;    eyes: EOMI, no scleral icterus;   Neck: soft, supple, non-tender, no masses appreciated  CV: Heart with regular rate and rhythm, normal S1/S2, no murmurs  Lungs: CTAB without wheezing, rhonchi or rales; good respiratory effort, no increased work of breathing  Neuro: Cranial nerves grossly intact; alert and oriented  Psych: Appropriate mood and affect  MSK: Right knee without effusion, antalgic gait, tenderness along the anteromedial aspect    1. HTN  -At goal in office  -Continue olmesartan 20 mg daily    2. DMII  -Last A1c 8 0.3; 05/2023  -Currently on metformin 1000 mg twice daily, Trulicity 0.75 mg weekly  -Continue to titrate up Trulicity and follow with [facility] pharmacist    3. Anxiety  -Continue citalopram 20 mg daily, alprazolam 0.5 mg 3 times daily as needed  -CSA up-to-date    4. R knee pain  -Plain film with moderate arthritis  -Physical therapy referral    5.  HLD  - Continue atorvastatin 40 mg daily  - Repeat lipid panel    F/U 3 months Medicare Chris D'Amico, DO

## 2023-08-11 ENCOUNTER — LAB (OUTPATIENT)
Dept: LAB | Facility: LAB | Age: 63
End: 2023-08-11
Payer: MEDICARE

## 2023-08-11 DIAGNOSIS — E11.9 TYPE 2 DIABETES MELLITUS WITHOUT COMPLICATION, WITHOUT LONG-TERM CURRENT USE OF INSULIN (MULTI): ICD-10-CM

## 2023-08-11 DIAGNOSIS — M25.561 ACUTE PAIN OF RIGHT KNEE: ICD-10-CM

## 2023-08-11 DIAGNOSIS — F41.9 ANXIETY DISORDER, UNSPECIFIED TYPE: ICD-10-CM

## 2023-08-11 DIAGNOSIS — I10 ESSENTIAL HYPERTENSION: ICD-10-CM

## 2023-08-11 DIAGNOSIS — E55.9 VITAMIN D DEFICIENCY: ICD-10-CM

## 2023-08-11 LAB
ALANINE AMINOTRANSFERASE (SGPT) (U/L) IN SER/PLAS: 24 U/L (ref 7–45)
ALBUMIN (G/DL) IN SER/PLAS: 4.1 G/DL (ref 3.4–5)
ALKALINE PHOSPHATASE (U/L) IN SER/PLAS: 72 U/L (ref 33–136)
ANION GAP IN SER/PLAS: 13 MMOL/L (ref 10–20)
ASPARTATE AMINOTRANSFERASE (SGOT) (U/L) IN SER/PLAS: 14 U/L (ref 9–39)
BILIRUBIN TOTAL (MG/DL) IN SER/PLAS: 0.7 MG/DL (ref 0–1.2)
CALCIDIOL (25 OH VITAMIN D3) (NG/ML) IN SER/PLAS: 65 NG/ML
CALCIUM (MG/DL) IN SER/PLAS: 9.4 MG/DL (ref 8.6–10.6)
CARBON DIOXIDE, TOTAL (MMOL/L) IN SER/PLAS: 28 MMOL/L (ref 21–32)
CHLORIDE (MMOL/L) IN SER/PLAS: 105 MMOL/L (ref 98–107)
CHOLESTEROL (MG/DL) IN SER/PLAS: 144 MG/DL (ref 0–199)
CHOLESTEROL IN HDL (MG/DL) IN SER/PLAS: 38.9 MG/DL
CHOLESTEROL/HDL RATIO: 3.7
CREATININE (MG/DL) IN SER/PLAS: 0.62 MG/DL (ref 0.5–1.05)
ERYTHROCYTE DISTRIBUTION WIDTH (RATIO) BY AUTOMATED COUNT: 12.2 % (ref 11.5–14.5)
ERYTHROCYTE MEAN CORPUSCULAR HEMOGLOBIN CONCENTRATION (G/DL) BY AUTOMATED: 33.1 G/DL (ref 32–36)
ERYTHROCYTE MEAN CORPUSCULAR VOLUME (FL) BY AUTOMATED COUNT: 89 FL (ref 80–100)
ERYTHROCYTES (10*6/UL) IN BLOOD BY AUTOMATED COUNT: 4.43 X10E12/L (ref 4–5.2)
ESTIMATED AVERAGE GLUCOSE FOR HBA1C: 180 MG/DL
GFR FEMALE: >90 ML/MIN/1.73M2
GLUCOSE (MG/DL) IN SER/PLAS: 146 MG/DL (ref 74–99)
HEMATOCRIT (%) IN BLOOD BY AUTOMATED COUNT: 39.3 % (ref 36–46)
HEMOGLOBIN (G/DL) IN BLOOD: 13 G/DL (ref 12–16)
HEMOGLOBIN A1C/HEMOGLOBIN TOTAL IN BLOOD: 7.9 %
LDL: 69 MG/DL (ref 0–99)
LEUKOCYTES (10*3/UL) IN BLOOD BY AUTOMATED COUNT: 7.1 X10E9/L (ref 4.4–11.3)
NRBC (PER 100 WBCS) BY AUTOMATED COUNT: 0 /100 WBC (ref 0–0)
PLATELETS (10*3/UL) IN BLOOD AUTOMATED COUNT: 251 X10E9/L (ref 150–450)
POTASSIUM (MMOL/L) IN SER/PLAS: 4.6 MMOL/L (ref 3.5–5.3)
PROTEIN TOTAL: 6.8 G/DL (ref 6.4–8.2)
SODIUM (MMOL/L) IN SER/PLAS: 141 MMOL/L (ref 136–145)
THYROTROPIN (MIU/L) IN SER/PLAS BY DETECTION LIMIT <= 0.05 MIU/L: 0.85 MIU/L (ref 0.44–3.98)
TRIGLYCERIDE (MG/DL) IN SER/PLAS: 179 MG/DL (ref 0–149)
UREA NITROGEN (MG/DL) IN SER/PLAS: 20 MG/DL (ref 6–23)
VLDL: 36 MG/DL (ref 0–40)

## 2023-08-11 PROCEDURE — 84443 ASSAY THYROID STIM HORMONE: CPT

## 2023-08-11 PROCEDURE — 82306 VITAMIN D 25 HYDROXY: CPT

## 2023-08-11 PROCEDURE — 85027 COMPLETE CBC AUTOMATED: CPT

## 2023-08-11 PROCEDURE — 36415 COLL VENOUS BLD VENIPUNCTURE: CPT

## 2023-08-11 PROCEDURE — 83036 HEMOGLOBIN GLYCOSYLATED A1C: CPT

## 2023-08-11 PROCEDURE — 80061 LIPID PANEL: CPT

## 2023-08-11 PROCEDURE — 80053 COMPREHEN METABOLIC PANEL: CPT

## 2023-08-14 ENCOUNTER — TELEMEDICINE (OUTPATIENT)
Dept: PHARMACY | Facility: HOSPITAL | Age: 63
End: 2023-08-14

## 2023-08-14 DIAGNOSIS — E11.9 DIABETES MELLITUS TYPE 2 WITHOUT RETINOPATHY (MULTI): Primary | ICD-10-CM

## 2023-08-14 RX ORDER — DULAGLUTIDE 1.5 MG/.5ML
1.5 INJECTION, SOLUTION SUBCUTANEOUS
Qty: 2 ML | Refills: 2 | Status: SHIPPED | OUTPATIENT
Start: 2023-08-14 | End: 2023-08-30 | Stop reason: SDUPTHER

## 2023-08-14 NOTE — RESULT ENCOUNTER NOTE
Hemoglobin A1c at 7.9, best labs she has had in the last couple years.  Still above goal, continue to follow with pharmacist and increase the medications we discussed.    HDL is low at 38.9, continue to improve diet and exercise.    Remaining labs mostly unremarkable.    You may have to call her son Cristopher, same last name, as there is a language barrier.  Patient has given permission to speak to him.

## 2023-08-14 NOTE — PROGRESS NOTES
"Pharmacist Clinic: Diabetes Management  Keith Fregoso is a 62 y.o. female was referred to Clinical Pharmacy Team for diabetes management. At last visit, no medication changes were made.     Referring Provider: Christopher D'Amico, DO     HISTORY OF PRESENT ILLNESS  Approximate Date of Diagnosis: approx 20 years ago  Diet: patient states she knows she does not eat healthy, she needs to work on it   Exercise Routine: minimal given her leg problems     LAB REVIEW   Glucose (mg/dL)   Date Value   08/11/2023 146 (H)   05/12/2023 175 (H)   02/08/2023 150 (H)     Hemoglobin A1C (%)   Date Value   08/11/2023 7.9 (A)   05/12/2023 8.3 (A)   02/08/2023 8.2 (A)     Bicarbonate (mmol/L)   Date Value   08/11/2023 28   05/12/2023 25   02/08/2023 31     Urea Nitrogen (mg/dL)   Date Value   08/11/2023 20   05/12/2023 18   02/08/2023 18     Creatinine (mg/dL)   Date Value   08/11/2023 0.62   05/12/2023 0.57   02/08/2023 0.59     Lab Results   Component Value Date    CHOL 144 08/11/2023    CHOL 157 05/12/2023    CHOL 165 02/08/2023     Lab Results   Component Value Date    HDL 38.9 (A) 08/11/2023    HDL 42.5 05/12/2023    HDL 53.9 02/08/2023     No results found for: \"LDLCALC\"  Lab Results   Component Value Date    TRIG 179 (H) 08/11/2023    TRIG 178 (H) 05/12/2023    TRIG 110 02/08/2023     DIABETES ASSESSMENT    CURRENT PHARMACOTHERAPY  - metformin 500 mg twice daily   - trulicity 0.75 mg under the skin once weekly (gives shot on sundays at 1:30)    SECONDARY PREVENTION  - Statin? Yes  - ACE-I/ARB? Yes, patient appears to be non-adherent     HISTORICAL PHARMACOTHERAPY  - SGLT2i: recurrent yeast infections     SMBG MEASUREMENTS: AM readings around 140-150s     Patient is tolerating trulicity without any issues, her A1c has come down to 7.9%, this is significant improvement and she should be very proud of herself. We want to see the A1c come down to 7%. She denies any side effects of the medication, and states she is eating not as " much as she used to.     RECOMMENDATIONS/PLAN  1. Patients diabetes is poorly controlled with most recent A1c of 8.3 % (goal < 7 %).   - Continue all meds under the continuation of care with the referring provider and clinical pharmacy team.   - Increase trulicity to 1.5 mg under the skin once weekly.     Clinical Pharmacist follow up: 1 month    Thank you,  Alejandra Espinal, PharmD    Verbal consent to manage patient's drug therapy was obtained from the patient. They were informed they may decline to participate or withdraw from participation in pharmacy services at any time.

## 2023-08-18 DIAGNOSIS — F41.9 ANXIETY: ICD-10-CM

## 2023-08-18 RX ORDER — ALPRAZOLAM 0.5 MG/1
0.5 TABLET ORAL 3 TIMES DAILY PRN
Qty: 90 TABLET | Refills: 0 | Status: SHIPPED
Start: 2023-08-18 | End: 2023-12-11 | Stop reason: SDUPTHER

## 2023-08-30 ENCOUNTER — TELEMEDICINE (OUTPATIENT)
Dept: PHARMACY | Facility: HOSPITAL | Age: 63
End: 2023-08-30

## 2023-08-30 DIAGNOSIS — E11.9 DIABETES MELLITUS TYPE 2 WITHOUT RETINOPATHY (MULTI): ICD-10-CM

## 2023-08-30 RX ORDER — DULAGLUTIDE 1.5 MG/.5ML
1.5 INJECTION, SOLUTION SUBCUTANEOUS
Qty: 2 ML | Refills: 2 | Status: SHIPPED | OUTPATIENT
Start: 2023-08-30 | End: 2023-08-30

## 2023-08-30 NOTE — PROGRESS NOTES
"Pharmacist Clinic: Diabetes Management  Keith Fregoso is a 62 y.o. female was referred to Clinical Pharmacy Team for diabetes management. At last visit, no medication changes were made, however patient hit the coverage gap. Patient brought in her income statements, we will get her enrolled in the  PAP.     Referring Provider: Christopher D'Amico,      HISTORY OF PRESENT ILLNESS  Approximate Date of Diagnosis: approx 20 years ago  Diet: patient states she knows she does not eat healthy, she needs to work on it   Exercise Routine: minimal given her leg problems     LAB REVIEW   Glucose (mg/dL)   Date Value   08/11/2023 146 (H)   05/12/2023 175 (H)   02/08/2023 150 (H)     Hemoglobin A1C (%)   Date Value   08/11/2023 7.9 (A)   05/12/2023 8.3 (A)   02/08/2023 8.2 (A)     Bicarbonate (mmol/L)   Date Value   08/11/2023 28   05/12/2023 25   02/08/2023 31     Urea Nitrogen (mg/dL)   Date Value   08/11/2023 20   05/12/2023 18   02/08/2023 18     Creatinine (mg/dL)   Date Value   08/11/2023 0.62   05/12/2023 0.57   02/08/2023 0.59     Lab Results   Component Value Date    CHOL 144 08/11/2023    CHOL 157 05/12/2023    CHOL 165 02/08/2023     Lab Results   Component Value Date    HDL 38.9 (A) 08/11/2023    HDL 42.5 05/12/2023    HDL 53.9 02/08/2023     No results found for: \"LDLCALC\"  Lab Results   Component Value Date    TRIG 179 (H) 08/11/2023    TRIG 178 (H) 05/12/2023    TRIG 110 02/08/2023     DIABETES ASSESSMENT    CURRENT PHARMACOTHERAPY  - metformin 500 mg twice daily   - trulicity 0.75 mg under the skin once weekly (gives shot on sundays at 1:30)    SECONDARY PREVENTION  - Statin? Yes  - ACE-I/ARB? Yes, patient appears to be non-adherent     HISTORICAL PHARMACOTHERAPY  - SGLT2i: recurrent yeast infections     SMBG MEASUREMENTS: AM readings around 140-150s    Patient is tolerating trulicity without any issues, her A1c has come down to 7.9%, this is significant improvement and she should be very proud of herself. " We want to see the A1c come down to 7%. She denies any side effects of the medication, and states she is eating not as much as she used to.     RECOMMENDATIONS/PLAN  1. Patients diabetes is poorly controlled with most recent A1c of 8.3 % (goal < 7 %).   - Continue all meds under the continuation of care with the referring provider and clinical pharmacy team.   - Increase trulicity to 1.5 mg under the skin once weekly.     Clinical Pharmacist follow up: 1 month    Thank you,  Alejandra Espinal, PharmD    Verbal consent to manage patient's drug therapy was obtained from the patient. They were informed they may decline to participate or withdraw from participation in pharmacy services at any time.

## 2023-09-13 ENCOUNTER — TELEMEDICINE (OUTPATIENT)
Dept: PHARMACY | Facility: HOSPITAL | Age: 63
End: 2023-09-13

## 2023-09-13 DIAGNOSIS — E11.9 DIABETES MELLITUS TYPE 2 WITHOUT RETINOPATHY (MULTI): Primary | ICD-10-CM

## 2023-09-13 NOTE — PROGRESS NOTES
"Pharmacist Clinic: Diabetes Management  Keith Fregoso is a 62 y.o. female was referred to Clinical Pharmacy Team for diabetes management. At last visit, we increased trulicity to 1.5 mg under the skin once weekly.     Referring Provider: Christopher D'Amico, DO     HISTORY OF PRESENT ILLNESS  Approximate Date of Diagnosis: approx 20 years ago  Diet: patient states she knows she does not eat healthy, she needs to work on it   Exercise Routine: minimal given her leg problems     LAB REVIEW   Glucose (mg/dL)   Date Value   08/11/2023 146 (H)   05/12/2023 175 (H)   02/08/2023 150 (H)     Hemoglobin A1C (%)   Date Value   08/11/2023 7.9 (A)   05/12/2023 8.3 (A)   02/08/2023 8.2 (A)     Bicarbonate (mmol/L)   Date Value   08/11/2023 28   05/12/2023 25   02/08/2023 31     Urea Nitrogen (mg/dL)   Date Value   08/11/2023 20   05/12/2023 18   02/08/2023 18     Creatinine (mg/dL)   Date Value   08/11/2023 0.62   05/12/2023 0.57   02/08/2023 0.59     Lab Results   Component Value Date    CHOL 144 08/11/2023    CHOL 157 05/12/2023    CHOL 165 02/08/2023     Lab Results   Component Value Date    HDL 38.9 (A) 08/11/2023    HDL 42.5 05/12/2023    HDL 53.9 02/08/2023     No results found for: \"LDLCALC\"  Lab Results   Component Value Date    TRIG 179 (H) 08/11/2023    TRIG 178 (H) 05/12/2023    TRIG 110 02/08/2023     DIABETES ASSESSMENT    CURRENT PHARMACOTHERAPY  - metformin 500 mg twice daily   - trulicity 1.5 mg under the skin once weekly    SECONDARY PREVENTION  - Statin? Yes  - ACE-I/ARB? Yes, patient appears to be non-adherent     HISTORICAL PHARMACOTHERAPY  - SGLT2i: recurrent yeast infections     SMBG MEASUREMENTS: AM readings around 140-150s    Patient is tolerating the higher dose of trulicity without concern. She denies any side effects, she gave herself 1 injection thus far.   Patient will continue to check her BG, we will discuss dose increase in 3 weeks.     RECOMMENDATIONS/PLAN  1. Patients diabetes is poorly " controlled with most recent A1c of 8.3 % (goal < 7 %).   - Continue all meds under the continuation of care with the referring provider and clinical pharmacy team    Clinical Pharmacist follow up: 1 month    Thank you,  Alejandra Espinal, Zeeshan    Verbal consent to manage patient's drug therapy was obtained from the patient. They were informed they may decline to participate or withdraw from participation in pharmacy services at any time.

## 2023-09-14 ENCOUNTER — APPOINTMENT (OUTPATIENT)
Dept: PHARMACY | Facility: HOSPITAL | Age: 63
End: 2023-09-14

## 2023-09-14 DIAGNOSIS — R42 VERTIGO: ICD-10-CM

## 2023-09-14 RX ORDER — MECLIZINE HYDROCHLORIDE 25 MG/1
TABLET ORAL
Qty: 30 TABLET | Refills: 2 | Status: SHIPPED | OUTPATIENT
Start: 2023-09-14 | End: 2023-11-15 | Stop reason: SDUPTHER

## 2023-09-28 PROBLEM — M54.2 NECK PAIN: Status: RESOLVED | Noted: 2023-05-11 | Resolved: 2023-09-28

## 2023-09-28 PROBLEM — S13.9XXA NECK SPRAIN: Status: RESOLVED | Noted: 2023-05-11 | Resolved: 2023-09-28

## 2023-09-28 PROBLEM — R22.0 FACIAL SWELLING: Status: RESOLVED | Noted: 2023-05-11 | Resolved: 2023-09-28

## 2023-09-28 PROBLEM — B37.31 YEAST INFECTION OF THE VAGINA: Status: RESOLVED | Noted: 2023-05-11 | Resolved: 2023-09-28

## 2023-09-28 PROBLEM — E11.9 DIABETES MELLITUS (MULTI): Status: RESOLVED | Noted: 2023-05-11 | Resolved: 2023-09-28

## 2023-09-28 PROBLEM — E66.9 CLASS 1 OBESITY: Status: ACTIVE | Noted: 2023-09-28

## 2023-09-28 PROBLEM — R61 NIGHT SWEATS: Status: RESOLVED | Noted: 2023-05-11 | Resolved: 2023-09-28

## 2023-09-28 PROBLEM — R10.32 ABDOMINAL PAIN, LLQ: Status: RESOLVED | Noted: 2023-05-11 | Resolved: 2023-09-28

## 2023-09-28 PROBLEM — R10.2 PELVIC PAIN IN FEMALE: Status: RESOLVED | Noted: 2023-05-11 | Resolved: 2023-09-28

## 2023-09-28 PROBLEM — M25.569 JOINT PAIN, KNEE: Status: RESOLVED | Noted: 2023-05-11 | Resolved: 2023-09-28

## 2023-09-28 PROBLEM — R42 DIZZINESS: Status: RESOLVED | Noted: 2023-05-11 | Resolved: 2023-09-28

## 2023-09-28 PROBLEM — J01.90 ACUTE SINUSITIS: Status: RESOLVED | Noted: 2023-05-11 | Resolved: 2023-09-28

## 2023-09-28 PROBLEM — R11.0 NAUSEA: Status: RESOLVED | Noted: 2023-05-11 | Resolved: 2023-09-28

## 2023-09-28 PROBLEM — E66.811 CLASS 1 OBESITY: Status: ACTIVE | Noted: 2023-09-28

## 2023-09-28 PROBLEM — Z87.898 H/O ABDOMINAL ABSCESS: Status: ACTIVE | Noted: 2023-09-28

## 2023-09-28 PROBLEM — M79.609 LIMB PAIN: Status: RESOLVED | Noted: 2023-05-11 | Resolved: 2023-09-28

## 2023-09-28 PROBLEM — R35.0 URINATION FREQUENCY: Status: RESOLVED | Noted: 2023-05-11 | Resolved: 2023-09-28

## 2023-09-28 RX ORDER — IBUPROFEN 600 MG/1
600 TABLET ORAL 2 TIMES DAILY
COMMUNITY

## 2023-09-28 RX ORDER — CLOTRIMAZOLE 1 %
CREAM (GRAM) TOPICAL 2 TIMES DAILY
COMMUNITY

## 2023-09-28 RX ORDER — GLIPIZIDE 5 MG/1
TABLET ORAL
COMMUNITY
Start: 2023-08-28

## 2023-09-28 RX ORDER — FLUCONAZOLE 150 MG/1
150 TABLET ORAL ONCE
COMMUNITY

## 2023-09-28 RX ORDER — TRIAMCINOLONE ACETONIDE 0.25 MG/G
1 CREAM TOPICAL 2 TIMES DAILY
COMMUNITY

## 2023-10-06 ENCOUNTER — PHARMACY VISIT (OUTPATIENT)
Dept: PHARMACY | Facility: CLINIC | Age: 63
End: 2023-10-06
Payer: MEDICARE

## 2023-10-06 ENCOUNTER — TELEMEDICINE CLINICAL SUPPORT (OUTPATIENT)
Dept: PHARMACY | Facility: HOSPITAL | Age: 63
End: 2023-10-06

## 2023-10-06 DIAGNOSIS — E11.9 DIABETES MELLITUS TYPE 2 WITHOUT RETINOPATHY (MULTI): Primary | ICD-10-CM

## 2023-10-06 PROCEDURE — RXMED WILLOW AMBULATORY MEDICATION CHARGE

## 2023-10-06 NOTE — PROGRESS NOTES
"Pharmacist Clinic: Diabetes Management  Keith Fregoso is a 62 y.o. female was referred to Clinical Pharmacy Team for diabetes management. At last visit, no medication changes were made.     Referring Provider: Christopher D'Amico, DO     HISTORY OF PRESENT ILLNESS  Approximate Date of Diagnosis: approx 20 years ago  Diet: patient states she knows she does not eat healthy, she needs to work on it   Exercise Routine: minimal given her leg problems     LAB REVIEW   Glucose (mg/dL)   Date Value   08/11/2023 146 (H)   05/12/2023 175 (H)   02/08/2023 150 (H)     Hemoglobin A1C (%)   Date Value   08/11/2023 7.9 (A)   05/12/2023 8.3 (A)   02/08/2023 8.2 (A)     Bicarbonate (mmol/L)   Date Value   08/11/2023 28   05/12/2023 25   02/08/2023 31     Urea Nitrogen (mg/dL)   Date Value   08/11/2023 20   05/12/2023 18   02/08/2023 18     Creatinine (mg/dL)   Date Value   08/11/2023 0.62   05/12/2023 0.57   02/08/2023 0.59     Lab Results   Component Value Date    CHOL 144 08/11/2023    CHOL 157 05/12/2023    CHOL 165 02/08/2023     Lab Results   Component Value Date    HDL 38.9 (A) 08/11/2023    HDL 42.5 05/12/2023    HDL 53.9 02/08/2023     No results found for: \"LDLCALC\"  Lab Results   Component Value Date    TRIG 179 (H) 08/11/2023    TRIG 178 (H) 05/12/2023    TRIG 110 02/08/2023     DIABETES ASSESSMENT    CURRENT PHARMACOTHERAPY  - metformin 500 mg twice daily   - trulicity 1.5 mg under the skin once weekly    SECONDARY PREVENTION  - Statin? Yes  - ACE-I/ARB? Yes, patient appears to be non-adherent     HISTORICAL PHARMACOTHERAPY  - SGLT2i: recurrent yeast infections     SMBG MEASUREMENTS: AM readings around 140-150s    Patient is tolerating the higher dose of trulicity without concern.   Patient states when she tests her sugars on other peoples monitors, she sees lower numbers.   Discussed to write down sugar readings and document what monitor you used.   Discussed checking expiration date on test strips. "     RECOMMENDATIONS/PLAN  1. Patients diabetes is poorly controlled with most recent A1c of 8.3 % (goal < 7 %).   - Continue all meds under the continuation of care with the referring provider and clinical pharmacy team    Clinical Pharmacist follow up: 1 month, dosage increase discussion     Thank you,  Alejandra Espinal, PharmD    Verbal consent to manage patient's drug therapy was obtained from the patient. They were informed they may decline to participate or withdraw from participation in pharmacy services at any time.

## 2023-11-06 ENCOUNTER — PHARMACY VISIT (OUTPATIENT)
Dept: PHARMACY | Facility: CLINIC | Age: 63
End: 2023-11-06
Payer: MEDICARE

## 2023-11-06 ENCOUNTER — TELEMEDICINE (OUTPATIENT)
Dept: PHARMACY | Facility: HOSPITAL | Age: 63
End: 2023-11-06
Payer: MEDICARE

## 2023-11-06 DIAGNOSIS — E11.9 DIABETES MELLITUS TYPE 2 WITHOUT RETINOPATHY (MULTI): Primary | ICD-10-CM

## 2023-11-06 PROCEDURE — RXMED WILLOW AMBULATORY MEDICATION CHARGE

## 2023-11-06 RX ORDER — DULAGLUTIDE 3 MG/.5ML
3 INJECTION, SOLUTION SUBCUTANEOUS
Qty: 2 ML | Refills: 2 | Status: SHIPPED | OUTPATIENT
Start: 2023-11-06 | End: 2023-12-04 | Stop reason: DRUGHIGH

## 2023-11-06 NOTE — PROGRESS NOTES
"Pharmacist Clinic: Diabetes Management  Keith Fregoso is a 62 y.o. female was referred to Clinical Pharmacy Team for diabetes management. At last visit, no medication changes were made.     Referring Provider: Christopher D'Amico, DO     HISTORY OF PRESENT ILLNESS  Approximate Date of Diagnosis: approx 20 years ago  Diet: patient states she knows she does not eat healthy, she needs to work on it   Exercise Routine: minimal given her leg problems     LAB REVIEW   Glucose (mg/dL)   Date Value   08/11/2023 146 (H)   05/12/2023 175 (H)   02/08/2023 150 (H)     Hemoglobin A1C (%)   Date Value   08/11/2023 7.9 (A)   05/12/2023 8.3 (A)   02/08/2023 8.2 (A)     Bicarbonate (mmol/L)   Date Value   08/11/2023 28   05/12/2023 25   02/08/2023 31     Urea Nitrogen (mg/dL)   Date Value   08/11/2023 20   05/12/2023 18   02/08/2023 18     Creatinine (mg/dL)   Date Value   08/11/2023 0.62   05/12/2023 0.57   02/08/2023 0.59     Lab Results   Component Value Date    CHOL 144 08/11/2023    CHOL 157 05/12/2023    CHOL 165 02/08/2023     Lab Results   Component Value Date    HDL 38.9 (A) 08/11/2023    HDL 42.5 05/12/2023    HDL 53.9 02/08/2023     No results found for: \"LDLCALC\"  Lab Results   Component Value Date    TRIG 179 (H) 08/11/2023    TRIG 178 (H) 05/12/2023    TRIG 110 02/08/2023     DIABETES ASSESSMENT    CURRENT PHARMACOTHERAPY  - metformin 500 mg twice daily   - trulicity 1.5 mg under the skin once weekly    SECONDARY PREVENTION  - Statin? Yes  - ACE-I/ARB? Yes, patient appears to be non-adherent     HISTORICAL PHARMACOTHERAPY  - SGLT2i: recurrent yeast infections     SMBG MEASUREMENTS: AM readings around 140-150s    Patient is tolerating the higher dose of trulicity without concern.   Plan to increase her dose of trulicity as her morning numbers are still elevated.     RECOMMENDATIONS/PLAN  1. Patients diabetes is poorly controlled with most recent A1c of 8.3 % (goal < 7 %).   - Continue all meds under the continuation " of care with the referring provider and clinical pharmacy team  - Increase trulicity to 3 mg once weekly.     Clinical Pharmacist follow up: 1 month, dosage increase discussion     Thank you,  Alejandra Espinal, PharmD    Verbal consent to manage patient's drug therapy was obtained from the patient. They were informed they may decline to participate or withdraw from participation in pharmacy services at any time.

## 2023-11-15 DIAGNOSIS — R42 VERTIGO: ICD-10-CM

## 2023-11-15 RX ORDER — MECLIZINE HYDROCHLORIDE 25 MG/1
TABLET ORAL
Qty: 30 TABLET | Refills: 3 | Status: SHIPPED
Start: 2023-11-15 | End: 2024-01-24 | Stop reason: SDUPTHER

## 2023-11-29 ENCOUNTER — TELEMEDICINE (OUTPATIENT)
Dept: PHARMACY | Facility: HOSPITAL | Age: 63
End: 2023-11-29
Payer: MEDICARE

## 2023-11-29 NOTE — PROGRESS NOTES
"Pharmacist Clinic: Diabetes Management  Keith Fregoso is a 63 y.o. female was referred to Clinical Pharmacy Team for diabetes management. At last visit, no medication changes were made.     Referring Provider: Christopher D'Amico, DO     HISTORY OF PRESENT ILLNESS  Approximate Date of Diagnosis: approx 20 years ago  Diet: patient states she knows she does not eat healthy, she needs to work on it   Exercise Routine: minimal given her leg problems     LAB REVIEW   Glucose (mg/dL)   Date Value   08/11/2023 146 (H)   05/12/2023 175 (H)   02/08/2023 150 (H)     Hemoglobin A1C (%)   Date Value   08/11/2023 7.9 (A)   05/12/2023 8.3 (A)   02/08/2023 8.2 (A)     Bicarbonate (mmol/L)   Date Value   08/11/2023 28   05/12/2023 25   02/08/2023 31     Urea Nitrogen (mg/dL)   Date Value   08/11/2023 20   05/12/2023 18   02/08/2023 18     Creatinine (mg/dL)   Date Value   08/11/2023 0.62   05/12/2023 0.57   02/08/2023 0.59     Lab Results   Component Value Date    CHOL 144 08/11/2023    CHOL 157 05/12/2023    CHOL 165 02/08/2023     Lab Results   Component Value Date    HDL 38.9 (A) 08/11/2023    HDL 42.5 05/12/2023    HDL 53.9 02/08/2023     No results found for: \"LDLCALC\"  Lab Results   Component Value Date    TRIG 179 (H) 08/11/2023    TRIG 178 (H) 05/12/2023    TRIG 110 02/08/2023     DIABETES ASSESSMENT    CURRENT PHARMACOTHERAPY  - metformin 500 mg twice daily   - trulicity 3 mg under the skin once weekly    SECONDARY PREVENTION  - Statin? Yes  - ACE-I/ARB? Yes, patient appears to be non-adherent     HISTORICAL PHARMACOTHERAPY  - SGLT2i: recurrent yeast infections     SMBG MEASUREMENTS: AM readings around 140-150s    Patient is tolerating the higher dose of trulicity without concern.   Plan to increase her dose of trulicity as her morning numbers are still elevated.     RECOMMENDATIONS/PLAN  1. Patients diabetes is poorly controlled with most recent A1c of 7.8 % (goal < 7 %).   - Continue all meds under the continuation of " care with the referring provider and clinical pharmacy team  - Increase trulicity to 3 mg once weekly.     Clinical Pharmacist follow up: 1 month, dosage increase discussion     Thank you,  Alejandra Espinal, PharmD    Verbal consent to manage patient's drug therapy was obtained from the patient. They were informed they may decline to participate or withdraw from participation in pharmacy services at any time.

## 2023-12-01 ENCOUNTER — LAB (OUTPATIENT)
Dept: LAB | Facility: LAB | Age: 63
End: 2023-12-01
Payer: MEDICARE

## 2023-12-01 ENCOUNTER — OFFICE VISIT (OUTPATIENT)
Dept: PRIMARY CARE | Facility: CLINIC | Age: 63
End: 2023-12-01
Payer: MEDICARE

## 2023-12-01 VITALS
OXYGEN SATURATION: 94 % | SYSTOLIC BLOOD PRESSURE: 114 MMHG | HEART RATE: 78 BPM | WEIGHT: 171 LBS | HEIGHT: 60 IN | DIASTOLIC BLOOD PRESSURE: 71 MMHG | BODY MASS INDEX: 33.57 KG/M2

## 2023-12-01 DIAGNOSIS — F41.9 ANXIETY DISORDER, UNSPECIFIED TYPE: ICD-10-CM

## 2023-12-01 DIAGNOSIS — Z12.11 SCREENING FOR COLON CANCER: ICD-10-CM

## 2023-12-01 DIAGNOSIS — Z00.00 WELLNESS EXAMINATION: ICD-10-CM

## 2023-12-01 DIAGNOSIS — E55.9 VITAMIN D DEFICIENCY: ICD-10-CM

## 2023-12-01 DIAGNOSIS — Z00.00 MEDICARE ANNUAL WELLNESS VISIT, SUBSEQUENT: ICD-10-CM

## 2023-12-01 DIAGNOSIS — Z78.0 ASYMPTOMATIC MENOPAUSAL STATE: ICD-10-CM

## 2023-12-01 DIAGNOSIS — Z12.31 ENCOUNTER FOR SCREENING MAMMOGRAM FOR MALIGNANT NEOPLASM OF BREAST: ICD-10-CM

## 2023-12-01 DIAGNOSIS — E11.9 TYPE 2 DIABETES MELLITUS WITHOUT COMPLICATION, WITHOUT LONG-TERM CURRENT USE OF INSULIN (MULTI): Primary | ICD-10-CM

## 2023-12-01 DIAGNOSIS — E11.9 TYPE 2 DIABETES MELLITUS WITHOUT COMPLICATION, WITHOUT LONG-TERM CURRENT USE OF INSULIN (MULTI): ICD-10-CM

## 2023-12-01 DIAGNOSIS — M25.561 CHRONIC PAIN OF RIGHT KNEE: ICD-10-CM

## 2023-12-01 DIAGNOSIS — I10 ESSENTIAL HYPERTENSION: ICD-10-CM

## 2023-12-01 DIAGNOSIS — G89.29 CHRONIC PAIN OF RIGHT KNEE: ICD-10-CM

## 2023-12-01 DIAGNOSIS — T75.3XXD MOTION SICKNESS, SUBSEQUENT ENCOUNTER: ICD-10-CM

## 2023-12-01 LAB
25(OH)D3 SERPL-MCNC: 75 NG/ML (ref 30–100)
ALBUMIN SERPL BCP-MCNC: 4.6 G/DL (ref 3.4–5)
ALP SERPL-CCNC: 77 U/L (ref 33–136)
ALT SERPL W P-5'-P-CCNC: 35 U/L (ref 7–45)
ANION GAP SERPL CALC-SCNC: 14 MMOL/L (ref 10–20)
AST SERPL W P-5'-P-CCNC: 22 U/L (ref 9–39)
BILIRUB SERPL-MCNC: 0.7 MG/DL (ref 0–1.2)
BUN SERPL-MCNC: 18 MG/DL (ref 6–23)
CALCIUM SERPL-MCNC: 9.8 MG/DL (ref 8.6–10.6)
CHLORIDE SERPL-SCNC: 102 MMOL/L (ref 98–107)
CHOLEST SERPL-MCNC: 153 MG/DL (ref 0–199)
CHOLESTEROL/HDL RATIO: 3.4
CO2 SERPL-SCNC: 28 MMOL/L (ref 21–32)
CREAT SERPL-MCNC: 0.68 MG/DL (ref 0.5–1.05)
CREAT UR-MCNC: 100.4 MG/DL (ref 20–320)
ERYTHROCYTE [DISTWIDTH] IN BLOOD BY AUTOMATED COUNT: 12.3 % (ref 11.5–14.5)
EST. AVERAGE GLUCOSE BLD GHB EST-MCNC: 192 MG/DL
GFR SERPL CREATININE-BSD FRML MDRD: >90 ML/MIN/1.73M*2
GLUCOSE SERPL-MCNC: 124 MG/DL (ref 74–99)
HBA1C MFR BLD: 8.3 %
HCT VFR BLD AUTO: 41.7 % (ref 36–46)
HDLC SERPL-MCNC: 45.5 MG/DL
HGB BLD-MCNC: 13.3 G/DL (ref 12–16)
LDLC SERPL CALC-MCNC: 84 MG/DL
MCH RBC QN AUTO: 28.4 PG (ref 26–34)
MCHC RBC AUTO-ENTMCNC: 31.9 G/DL (ref 32–36)
MCV RBC AUTO: 89 FL (ref 80–100)
MICROALBUMIN UR-MCNC: 29.8 MG/L
MICROALBUMIN/CREAT UR: 29.7 UG/MG CREAT
NON HDL CHOLESTEROL: 108 MG/DL (ref 0–149)
NRBC BLD-RTO: 0 /100 WBCS (ref 0–0)
PLATELET # BLD AUTO: 296 X10*3/UL (ref 150–450)
POTASSIUM SERPL-SCNC: 4.8 MMOL/L (ref 3.5–5.3)
PROT SERPL-MCNC: 7.5 G/DL (ref 6.4–8.2)
RBC # BLD AUTO: 4.69 X10*6/UL (ref 4–5.2)
SODIUM SERPL-SCNC: 139 MMOL/L (ref 136–145)
TRIGL SERPL-MCNC: 120 MG/DL (ref 0–149)
TSH SERPL-ACNC: 1.01 MIU/L (ref 0.44–3.98)
VLDL: 24 MG/DL (ref 0–40)
WBC # BLD AUTO: 8.5 X10*3/UL (ref 4.4–11.3)

## 2023-12-01 PROCEDURE — 82043 UR ALBUMIN QUANTITATIVE: CPT

## 2023-12-01 PROCEDURE — G0009 ADMIN PNEUMOCOCCAL VACCINE: HCPCS | Performed by: STUDENT IN AN ORGANIZED HEALTH CARE EDUCATION/TRAINING PROGRAM

## 2023-12-01 PROCEDURE — 1036F TOBACCO NON-USER: CPT | Performed by: STUDENT IN AN ORGANIZED HEALTH CARE EDUCATION/TRAINING PROGRAM

## 2023-12-01 PROCEDURE — 83036 HEMOGLOBIN GLYCOSYLATED A1C: CPT

## 2023-12-01 PROCEDURE — 99214 OFFICE O/P EST MOD 30 MIN: CPT | Performed by: STUDENT IN AN ORGANIZED HEALTH CARE EDUCATION/TRAINING PROGRAM

## 2023-12-01 PROCEDURE — 80053 COMPREHEN METABOLIC PANEL: CPT

## 2023-12-01 PROCEDURE — 80061 LIPID PANEL: CPT

## 2023-12-01 PROCEDURE — 1170F FXNL STATUS ASSESSED: CPT | Performed by: STUDENT IN AN ORGANIZED HEALTH CARE EDUCATION/TRAINING PROGRAM

## 2023-12-01 PROCEDURE — 3074F SYST BP LT 130 MM HG: CPT | Performed by: STUDENT IN AN ORGANIZED HEALTH CARE EDUCATION/TRAINING PROGRAM

## 2023-12-01 PROCEDURE — 84443 ASSAY THYROID STIM HORMONE: CPT

## 2023-12-01 PROCEDURE — 1159F MED LIST DOCD IN RCRD: CPT | Performed by: STUDENT IN AN ORGANIZED HEALTH CARE EDUCATION/TRAINING PROGRAM

## 2023-12-01 PROCEDURE — G0008 ADMIN INFLUENZA VIRUS VAC: HCPCS | Performed by: STUDENT IN AN ORGANIZED HEALTH CARE EDUCATION/TRAINING PROGRAM

## 2023-12-01 PROCEDURE — 90662 IIV NO PRSV INCREASED AG IM: CPT | Performed by: STUDENT IN AN ORGANIZED HEALTH CARE EDUCATION/TRAINING PROGRAM

## 2023-12-01 PROCEDURE — 99397 PER PM REEVAL EST PAT 65+ YR: CPT | Performed by: STUDENT IN AN ORGANIZED HEALTH CARE EDUCATION/TRAINING PROGRAM

## 2023-12-01 PROCEDURE — 36415 COLL VENOUS BLD VENIPUNCTURE: CPT

## 2023-12-01 PROCEDURE — 85027 COMPLETE CBC AUTOMATED: CPT

## 2023-12-01 PROCEDURE — 1160F RVW MEDS BY RX/DR IN RCRD: CPT | Performed by: STUDENT IN AN ORGANIZED HEALTH CARE EDUCATION/TRAINING PROGRAM

## 2023-12-01 PROCEDURE — 82306 VITAMIN D 25 HYDROXY: CPT

## 2023-12-01 PROCEDURE — 3078F DIAST BP <80 MM HG: CPT | Performed by: STUDENT IN AN ORGANIZED HEALTH CARE EDUCATION/TRAINING PROGRAM

## 2023-12-01 PROCEDURE — 4010F ACE/ARB THERAPY RXD/TAKEN: CPT | Performed by: STUDENT IN AN ORGANIZED HEALTH CARE EDUCATION/TRAINING PROGRAM

## 2023-12-01 PROCEDURE — G0439 PPPS, SUBSEQ VISIT: HCPCS | Performed by: STUDENT IN AN ORGANIZED HEALTH CARE EDUCATION/TRAINING PROGRAM

## 2023-12-01 PROCEDURE — 3051F HG A1C>EQUAL 7.0%<8.0%: CPT | Performed by: STUDENT IN AN ORGANIZED HEALTH CARE EDUCATION/TRAINING PROGRAM

## 2023-12-01 PROCEDURE — 90677 PCV20 VACCINE IM: CPT | Performed by: STUDENT IN AN ORGANIZED HEALTH CARE EDUCATION/TRAINING PROGRAM

## 2023-12-01 PROCEDURE — 82570 ASSAY OF URINE CREATININE: CPT

## 2023-12-01 RX ORDER — SCOLOPAMINE TRANSDERMAL SYSTEM 1 MG/1
1 PATCH, EXTENDED RELEASE TRANSDERMAL
Qty: 30 PATCH | Refills: 0 | Status: SHIPPED | OUTPATIENT
Start: 2023-12-01 | End: 2024-02-29

## 2023-12-01 ASSESSMENT — ENCOUNTER SYMPTOMS
LOSS OF SENSATION IN FEET: 0
OCCASIONAL FEELINGS OF UNSTEADINESS: 0
DEPRESSION: 0

## 2023-12-01 ASSESSMENT — ACTIVITIES OF DAILY LIVING (ADL)
MANAGING_FINANCES: INDEPENDENT
GROCERY_SHOPPING: INDEPENDENT
BATHING: INDEPENDENT
DRESSING: INDEPENDENT
DOING_HOUSEWORK: INDEPENDENT
TAKING_MEDICATION: INDEPENDENT

## 2023-12-01 NOTE — LETTER
December 8, 2023     Keith Fregoso  [address] [place] [address]      Dear Ms. Fregoso:    Below are the results from your recent visit:      Hemoglobin A1c at 8.3, has gone up since last lab.  Continue to increase Trulicity with the pharmacist, can consider additional medication as needed.  Not meeting goal of A1c 7.0.     LDL at 84, slightly above diabetic goal of 70.  Continue to improve diet, continue with medication as prescribed.     Remaining labs mostly unremarkable.         If you have any questions or concerns, please don't hesitate to call.

## 2023-12-01 NOTE — LETTER
December 5, 2023     Keith Fregoso  [address] [place] [address]      Dear Ms. Fregoso:    Below are the results from your recent visit:      Hemoglobin A1c at 8.3, has gone up since last lab.  Continue to increase Trulicity with the pharmacist, can consider additional medication as needed.  Not meeting goal of A1c 7.0.     LDL at 84, slightly above diabetic goal of 70.  Continue to improve diet, continue with medication as prescribed.     Remaining labs mostly unremarkable.           If you have any questions or concerns, please don't hesitate to call.

## 2023-12-01 NOTE — PROGRESS NOTES
Subjective   Reason for Visit: Keith Fregoso is an 63 y.o. female here for a Medicare Wellness visit.          Reviewed all medications by prescribing practitioner or clinical pharmacist (such as prescriptions, OTCs, herbal therapies and supplements) and documented in the medical record.    HPI    Patient Care Team:  Christopher D'Amico, DO as PCP - General  Carlos Iverson DO as PCP - [facility] Attributed Provider     Review of Systems    Objective   Vitals:  /71   Pulse 78   Ht 1.524 m (5')   Wt 77.6 kg (171 lb)   SpO2 94%   BMI 33.40 kg/m²       Physical Exam    Assessment/Plan   Problem List Items Addressed This Visit    None           HPI: 63-year-old female presenting for follow-up on chronic conditions, Medicare annual wellness exam/CPE.    HTN  Stable, tolerating current regimen well.     DMII  Stable, tolerating current regimen well.  Following with [facility] pharmacist.     Anxiety  Stable on current regimen.     Right knee pain  Some improvement with home exercises and prednisone burst.  Still having clicking and pain with walking.  Did not do formal physical therapy    SocHx:   - Smoking: Never    Denies HA, changes in vision, chest pain, SOB/TAYLOR, palpitations, N/V/D/C, dysuria/hematuria, hematochezia/melena, paresthesias, LE edema.    12 point ROS reviewed and negative other than as stated in HPI      General: Alert, oriented, pleasant, in no acute distress  HEENT:      Head: normocephalic, atraumatic;      eyes: EOMI, no scleral icterus;   Neck: soft, supple, non-tender, no masses appreciated  CV: Heart with regular rate and rhythm, normal S1/S2, no murmurs  Lungs: CTAB without wheezing, rhonchi or rales; good respiratory effort, no increased work of breathing  Abdomen: soft, non-tender, non-distended, no masses appreciated  Extremities: no edema, no cyanosis  Neuro: Cranial nerves grossly intact; alert and oriented  Psych: Appropriate mood and affect    #HM  -CBC, CMP, Lipid panel, Vit D, TSH  with reflex T4  -Vaccines:       Flu: In office      Shingrix: Recommended, advised to go to local pharmacy      Pneumococcal: Prevnar 20 in office      Tdap: Recommended, advised to go to local pharmacy  -Sanam w/ angelita: Ordered  -Last PAP: Follow GYN  -Lung cancer screening with low-dose CT: never smoker  -Colonoscopy: Ordered  -Osteoporosis screening: Ordered     #HTN  -At goal in office  -Continue olmesartan 20 mg daily     #DMII  -Last A1c 7.9, 8/23  -UTD diabetic eye exam  - Diabetic foot exam negative in office  -Currently on metformin 1000 mg twice daily, Trulicity 3 mg weekly  -Continue to titrate up Trulicity and follow with [facility] pharmacist  -Repeat A1c, albumin    #Anxiety  -Continue citalopram 20 mg daily, alprazolam 0.5 mg 3 times daily as needed  -CSA up-to-date     #R knee pain  -Plain film with moderate arthritis  -Physical therapy referral, patient did not go  - Orthopedic referral     #HLD  - Continue atorvastatin 40 mg daily  - Repeat lipid panel    F/U 3 months, sooner if indicated    Chris D'Amico, DO

## 2023-12-04 ENCOUNTER — TELEMEDICINE (OUTPATIENT)
Dept: PHARMACY | Facility: HOSPITAL | Age: 63
End: 2023-12-04
Payer: MEDICARE

## 2023-12-04 ENCOUNTER — PHARMACY VISIT (OUTPATIENT)
Dept: PHARMACY | Facility: CLINIC | Age: 63
End: 2023-12-04
Payer: MEDICARE

## 2023-12-04 DIAGNOSIS — E11.9 DIABETES MELLITUS TYPE 2 WITHOUT RETINOPATHY (MULTI): Primary | ICD-10-CM

## 2023-12-04 PROCEDURE — RXMED WILLOW AMBULATORY MEDICATION CHARGE

## 2023-12-04 RX ORDER — DULAGLUTIDE 4.5 MG/.5ML
4.5 INJECTION, SOLUTION SUBCUTANEOUS
Qty: 2 ML | Refills: 2 | Status: SHIPPED | OUTPATIENT
Start: 2023-12-04 | End: 2024-02-22 | Stop reason: SDUPTHER

## 2023-12-04 NOTE — PROGRESS NOTES
Pharmacist Clinic: Diabetes Management  Keith Fregoso is a 63 y.o. female was referred to Clinical Pharmacy Team for diabetes management. At last visit, no medication changes were made.     Referring Provider: Christopher D'Amico, DO     HISTORY OF PRESENT ILLNESS  Approximate Date of Diagnosis: approx 20 years ago  Diet: patient states she knows she does not eat healthy, she needs to work on it   Exercise Routine: minimal given her leg problems     LAB REVIEW   Glucose (mg/dL)   Date Value   12/01/2023 124 (H)   08/11/2023 146 (H)   05/12/2023 175 (H)   02/08/2023 150 (H)     Hemoglobin A1C (%)   Date Value   12/01/2023 8.3 (H)   08/11/2023 7.9 (A)   05/12/2023 8.3 (A)   02/08/2023 8.2 (A)     Bicarbonate (mmol/L)   Date Value   12/01/2023 28   08/11/2023 28   05/12/2023 25   02/08/2023 31     Urea Nitrogen (mg/dL)   Date Value   12/01/2023 18   08/11/2023 20   05/12/2023 18   02/08/2023 18     Creatinine (mg/dL)   Date Value   12/01/2023 0.68   08/11/2023 0.62   05/12/2023 0.57   02/08/2023 0.59     Lab Results   Component Value Date    CHOL 153 12/01/2023    CHOL 144 08/11/2023    CHOL 157 05/12/2023     Lab Results   Component Value Date    HDL 45.5 12/01/2023    HDL 38.9 (A) 08/11/2023    HDL 42.5 05/12/2023     Lab Results   Component Value Date    LDLCALC 84 12/01/2023     Lab Results   Component Value Date    TRIG 120 12/01/2023    TRIG 179 (H) 08/11/2023    TRIG 178 (H) 05/12/2023     DIABETES ASSESSMENT    CURRENT PHARMACOTHERAPY  - metformin 500 mg twice daily   - trulicity 3 mg under the skin once weekly    SECONDARY PREVENTION  - Statin? Yes  - ACE-I/ARB? Yes, patient appears to be non-adherent     HISTORICAL PHARMACOTHERAPY  - SGLT2i: recurrent yeast infections     SMBG MEASUREMENTS: AM readings around 160-170s    DISCUSSION:  Your A1c came back up to 8.3%   Because of this we will increase your trulicity to 4.5 mg once weekly   Start working on eating healthier, at next A1c we will likely have to  add on another medication if we are still not at goal    RECOMMENDATIONS/PLAN  1. Patients diabetes is poorly controlled with most recent A1c of 7.8 % (goal < 7 %).   - Continue all meds under the continuation of care with the referring provider and clinical pharmacy team  - Increase trulicity to 4.5 mg once weekly.     Clinical Pharmacist follow up: 1 month    Thank you,  Alejandra Espinal, PharmD    Verbal consent to manage patient's drug therapy was obtained from the patient. They were informed they may decline to participate or withdraw from participation in pharmacy services at any time.

## 2023-12-05 ENCOUNTER — TELEPHONE (OUTPATIENT)
Dept: PRIMARY CARE | Facility: CLINIC | Age: 63
End: 2023-12-05
Payer: MEDICARE

## 2023-12-05 NOTE — RESULT ENCOUNTER NOTE
Hemoglobin A1c at 8.3, has gone up since last lab.  Continue to increase Trulicity with the pharmacist, can consider additional medication as needed.  Not meeting goal of A1c 7.0.    LDL at 84, slightly above diabetic goal of 70.  Continue to improve diet, continue with medication as prescribed.    Remaining labs mostly unremarkable.

## 2023-12-11 DIAGNOSIS — K21.9 GASTROESOPHAGEAL REFLUX DISEASE, UNSPECIFIED WHETHER ESOPHAGITIS PRESENT: ICD-10-CM

## 2023-12-11 DIAGNOSIS — F41.9 ANXIETY: ICD-10-CM

## 2023-12-11 DIAGNOSIS — I15.9 SECONDARY HYPERTENSION: ICD-10-CM

## 2023-12-11 RX ORDER — AMLODIPINE BESYLATE 10 MG/1
10 TABLET ORAL DAILY
Qty: 90 TABLET | Refills: 1 | Status: SHIPPED | OUTPATIENT
Start: 2023-12-11

## 2023-12-11 RX ORDER — ALPRAZOLAM 0.5 MG/1
0.5 TABLET ORAL 3 TIMES DAILY PRN
Qty: 90 TABLET | Refills: 0 | Status: SHIPPED | OUTPATIENT
Start: 2023-12-11 | End: 2024-03-21 | Stop reason: SDUPTHER

## 2023-12-12 RX ORDER — OLMESARTAN MEDOXOMIL 20 MG/1
20 TABLET ORAL DAILY
Qty: 90 TABLET | Refills: 3 | Status: SHIPPED | OUTPATIENT
Start: 2023-12-12 | End: 2024-03-21 | Stop reason: SDUPTHER

## 2023-12-18 ENCOUNTER — TELEMEDICINE (OUTPATIENT)
Dept: PHARMACY | Facility: HOSPITAL | Age: 63
End: 2023-12-18
Payer: MEDICARE

## 2023-12-18 DIAGNOSIS — E11.9 DIABETES MELLITUS TYPE 2 WITHOUT RETINOPATHY (MULTI): Primary | ICD-10-CM

## 2023-12-18 NOTE — PROGRESS NOTES
Pharmacist Clinic: Diabetes Management  Keith Fregoso is a 63 y.o. female was referred to Clinical Pharmacy Team for diabetes management. At last visit, no medication changes were made.     Referring Provider: Christopher D'Amico, DO     HISTORY OF PRESENT ILLNESS  Approximate Date of Diagnosis: approx 20 years ago  Diet: patient states she knows she does not eat healthy, she needs to work on it   Exercise Routine: minimal given her leg problems     LAB REVIEW   Glucose (mg/dL)   Date Value   12/01/2023 124 (H)   08/11/2023 146 (H)   05/12/2023 175 (H)   02/08/2023 150 (H)     Hemoglobin A1C (%)   Date Value   12/01/2023 8.3 (H)   08/11/2023 7.9 (A)   05/12/2023 8.3 (A)   02/08/2023 8.2 (A)     Bicarbonate (mmol/L)   Date Value   12/01/2023 28   08/11/2023 28   05/12/2023 25   02/08/2023 31     Urea Nitrogen (mg/dL)   Date Value   12/01/2023 18   08/11/2023 20   05/12/2023 18   02/08/2023 18     Creatinine (mg/dL)   Date Value   12/01/2023 0.68   08/11/2023 0.62   05/12/2023 0.57   02/08/2023 0.59     Lab Results   Component Value Date    CHOL 153 12/01/2023    CHOL 144 08/11/2023    CHOL 157 05/12/2023     Lab Results   Component Value Date    HDL 45.5 12/01/2023    HDL 38.9 (A) 08/11/2023    HDL 42.5 05/12/2023     Lab Results   Component Value Date    LDLCALC 84 12/01/2023     Lab Results   Component Value Date    TRIG 120 12/01/2023    TRIG 179 (H) 08/11/2023    TRIG 178 (H) 05/12/2023     DIABETES ASSESSMENT    CURRENT PHARMACOTHERAPY  - metformin 500 mg twice daily   - trulicity 4.5 mg under the skin once weekly    SECONDARY PREVENTION  - Statin? Yes  - ACE-I/ARB? Yes, patient appears to be non-adherent     HISTORICAL PHARMACOTHERAPY  - SGLT2i: recurrent yeast infections     SMBG MEASUREMENTS: AM readings around 140-150     DISCUSSION:  Patient is tolerating increased dose of trulicity (4.5 mg) she has given herself 2 injections and denies any new onset side effects    RECOMMENDATIONS/PLAN  1. Patients  diabetes is poorly controlled with most recent A1c of 7.8 % (goal < 7 %).   - Continue all meds under the continuation of care with the referring provider and clinical pharmacy team    Clinical Pharmacist follow up: 1 month    Thank you,  Alejandra Espinal, Zeeshan    Verbal consent to manage patient's drug therapy was obtained from the patient. They were informed they may decline to participate or withdraw from participation in pharmacy services at any time.

## 2024-01-03 PROCEDURE — RXMED WILLOW AMBULATORY MEDICATION CHARGE

## 2024-01-04 ENCOUNTER — PHARMACY VISIT (OUTPATIENT)
Dept: PHARMACY | Facility: CLINIC | Age: 64
End: 2024-01-04
Payer: MEDICARE

## 2024-01-18 ENCOUNTER — APPOINTMENT (OUTPATIENT)
Dept: PHARMACY | Facility: HOSPITAL | Age: 64
End: 2024-01-18
Payer: MEDICARE

## 2024-01-24 DIAGNOSIS — R42 VERTIGO: ICD-10-CM

## 2024-01-24 PROCEDURE — RXMED WILLOW AMBULATORY MEDICATION CHARGE

## 2024-01-24 RX ORDER — MECLIZINE HYDROCHLORIDE 25 MG/1
TABLET ORAL
Qty: 30 TABLET | Refills: 3 | Status: SHIPPED | OUTPATIENT
Start: 2024-01-24 | End: 2024-04-02

## 2024-01-25 ENCOUNTER — PHARMACY VISIT (OUTPATIENT)
Dept: PHARMACY | Facility: CLINIC | Age: 64
End: 2024-01-25
Payer: MEDICARE

## 2024-02-01 ENCOUNTER — TELEMEDICINE (OUTPATIENT)
Dept: PHARMACY | Facility: HOSPITAL | Age: 64
End: 2024-02-01
Payer: MEDICARE

## 2024-02-01 DIAGNOSIS — E11.9 DIABETES MELLITUS TYPE 2 WITHOUT RETINOPATHY (MULTI): Primary | ICD-10-CM

## 2024-02-01 RX ORDER — DAPAGLIFLOZIN 10 MG/1
10 TABLET, FILM COATED ORAL DAILY
Qty: 90 TABLET | Refills: 1 | Status: SHIPPED | OUTPATIENT
Start: 2024-02-01

## 2024-02-02 ENCOUNTER — PHARMACY VISIT (OUTPATIENT)
Dept: PHARMACY | Facility: CLINIC | Age: 64
End: 2024-02-02
Payer: MEDICARE

## 2024-02-02 PROCEDURE — RXMED WILLOW AMBULATORY MEDICATION CHARGE

## 2024-02-22 DIAGNOSIS — E11.9 DIABETES MELLITUS TYPE 2 WITHOUT RETINOPATHY (MULTI): ICD-10-CM

## 2024-02-22 DIAGNOSIS — E78.00 HYPERCHOLESTEROLEMIA: ICD-10-CM

## 2024-02-22 PROCEDURE — RXMED WILLOW AMBULATORY MEDICATION CHARGE

## 2024-02-22 RX ORDER — DULAGLUTIDE 4.5 MG/.5ML
4.5 INJECTION, SOLUTION SUBCUTANEOUS
Qty: 2 ML | Refills: 0 | Status: SHIPPED | OUTPATIENT
Start: 2024-02-22 | End: 2024-03-15 | Stop reason: SDUPTHER

## 2024-02-22 RX ORDER — ATORVASTATIN CALCIUM 40 MG/1
40 TABLET, FILM COATED ORAL NIGHTLY
Qty: 90 TABLET | Refills: 1 | Status: SHIPPED | OUTPATIENT
Start: 2024-02-22 | End: 2024-04-24 | Stop reason: SDUPTHER

## 2024-02-26 ENCOUNTER — PHARMACY VISIT (OUTPATIENT)
Dept: PHARMACY | Facility: CLINIC | Age: 64
End: 2024-02-26
Payer: MEDICARE

## 2024-03-01 ENCOUNTER — TELEMEDICINE (OUTPATIENT)
Dept: PHARMACY | Facility: HOSPITAL | Age: 64
End: 2024-03-01
Payer: MEDICARE

## 2024-03-01 DIAGNOSIS — E11.9 DIABETES MELLITUS TYPE 2 WITHOUT RETINOPATHY (MULTI): Primary | ICD-10-CM

## 2024-03-01 NOTE — PROGRESS NOTES
Pharmacist Clinic: Diabetes Management  Keith Fregoso is a 63 y.o. female was referred to Clinical Pharmacy Team for diabetes management. At last visit, no medication changes were made.     Referring Provider: Christopher D'Amico, DO     HISTORY OF PRESENT ILLNESS  Approximate Date of Diagnosis: approx 20 years ago  Diet: patient states she knows she does not eat healthy, she needs to work on it   Exercise Routine: minimal given her leg problems     LAB REVIEW   Glucose (mg/dL)   Date Value   12/01/2023 124 (H)   08/11/2023 146 (H)   05/12/2023 175 (H)   02/08/2023 150 (H)     Hemoglobin A1C (%)   Date Value   12/01/2023 8.3 (H)   08/11/2023 7.9 (A)   05/12/2023 8.3 (A)   02/08/2023 8.2 (A)     Bicarbonate (mmol/L)   Date Value   12/01/2023 28   08/11/2023 28   05/12/2023 25   02/08/2023 31     Urea Nitrogen (mg/dL)   Date Value   12/01/2023 18   08/11/2023 20   05/12/2023 18   02/08/2023 18     Creatinine (mg/dL)   Date Value   12/01/2023 0.68   08/11/2023 0.62   05/12/2023 0.57   02/08/2023 0.59     Lab Results   Component Value Date    CHOL 153 12/01/2023    CHOL 144 08/11/2023    CHOL 157 05/12/2023     Lab Results   Component Value Date    HDL 45.5 12/01/2023    HDL 38.9 (A) 08/11/2023    HDL 42.5 05/12/2023     Lab Results   Component Value Date    LDLCALC 84 12/01/2023     Lab Results   Component Value Date    TRIG 120 12/01/2023    TRIG 179 (H) 08/11/2023    TRIG 178 (H) 05/12/2023     DIABETES ASSESSMENT    CURRENT PHARMACOTHERAPY  - metformin 500 mg twice daily   - trulicity 4.5 mg under the skin once weekly  - farxiga 10 mg once daily    SECONDARY PREVENTION  - Statin? Yes  - ACE-I/ARB? Yes, patient appears to be non-adherent     HISTORICAL PHARMACOTHERAPY  - SGLT2i: recurrent yeast infections (2 yeast infections, both times A1c was over 9%)    SMBG MEASUREMENTS: AM readings around 140-150     DISCUSSION:  Patient is tolerating increased dose of trulicity (4.5 mg), she denies any side effects   Patient  is tolerating farxiga without issues, she denies any side effects or  infections     RECOMMENDATIONS/PLAN  1. Patients diabetes is poorly controlled with most recent A1c of 8.3 % (goal < 7 %).   - Continue all meds under the continuation of care with the referring provider and clinical pharmacy team    Clinical Pharmacist follow up: 1 month    Thank you,  Alejandra Espinal, PharmD    Verbal consent to manage patient's drug therapy was obtained from the patient. They were informed they may decline to participate or withdraw from participation in pharmacy services at any time.

## 2024-03-15 DIAGNOSIS — E11.9 DIABETES MELLITUS TYPE 2 WITHOUT RETINOPATHY (MULTI): ICD-10-CM

## 2024-03-19 PROCEDURE — RXMED WILLOW AMBULATORY MEDICATION CHARGE

## 2024-03-19 RX ORDER — DULAGLUTIDE 4.5 MG/.5ML
4.5 INJECTION, SOLUTION SUBCUTANEOUS
Qty: 2 ML | Refills: 0 | Status: SHIPPED | OUTPATIENT
Start: 2024-03-19 | End: 2024-03-22 | Stop reason: SDUPTHER

## 2024-03-20 ENCOUNTER — OFFICE VISIT (OUTPATIENT)
Dept: ORTHOPEDIC SURGERY | Facility: CLINIC | Age: 64
End: 2024-03-20
Payer: MEDICARE

## 2024-03-20 ENCOUNTER — PHARMACY VISIT (OUTPATIENT)
Dept: PHARMACY | Facility: CLINIC | Age: 64
End: 2024-03-20
Payer: MEDICARE

## 2024-03-20 DIAGNOSIS — G89.29 CHRONIC PAIN OF RIGHT KNEE: ICD-10-CM

## 2024-03-20 DIAGNOSIS — Z86.39 HX OF DIABETES MELLITUS: ICD-10-CM

## 2024-03-20 DIAGNOSIS — M25.561 CHRONIC PAIN OF RIGHT KNEE: ICD-10-CM

## 2024-03-20 DIAGNOSIS — M17.11 LOCALIZED OSTEOARTHRITIS OF RIGHT KNEE: Primary | ICD-10-CM

## 2024-03-20 PROCEDURE — 20610 DRAIN/INJ JOINT/BURSA W/O US: CPT | Performed by: FAMILY MEDICINE

## 2024-03-20 PROCEDURE — 99204 OFFICE O/P NEW MOD 45 MIN: CPT | Performed by: FAMILY MEDICINE

## 2024-03-20 PROCEDURE — 1159F MED LIST DOCD IN RCRD: CPT | Performed by: FAMILY MEDICINE

## 2024-03-20 PROCEDURE — 4010F ACE/ARB THERAPY RXD/TAKEN: CPT | Performed by: FAMILY MEDICINE

## 2024-03-20 PROCEDURE — 1036F TOBACCO NON-USER: CPT | Performed by: FAMILY MEDICINE

## 2024-03-20 RX ORDER — LIDOCAINE HYDROCHLORIDE 10 MG/ML
4 INJECTION INFILTRATION; PERINEURAL
Status: COMPLETED | OUTPATIENT
Start: 2024-03-20 | End: 2024-03-20

## 2024-03-20 RX ORDER — TRIAMCINOLONE ACETONIDE 40 MG/ML
40 INJECTION, SUSPENSION INTRA-ARTICULAR; INTRAMUSCULAR
Status: COMPLETED | OUTPATIENT
Start: 2024-03-20 | End: 2024-03-20

## 2024-03-20 RX ADMIN — LIDOCAINE HYDROCHLORIDE 4 ML: 10 INJECTION INFILTRATION; PERINEURAL at 10:08

## 2024-03-20 RX ADMIN — TRIAMCINOLONE ACETONIDE 40 MG: 40 INJECTION, SUSPENSION INTRA-ARTICULAR; INTRAMUSCULAR at 10:08

## 2024-03-20 NOTE — LETTER
March 20, 2024     Christopher D'Amico,   [address] [facility], [address]  [facility] [address]    Patient: Keith Fregoso   YOB: 1960   Date of Visit: 3/20/2024       Dear Dr. Christopher D'Amico, DO:    Thank you for referring Keith Fregoso to me for evaluation. Below are my notes for this consultation.  If you have questions, please do not hesitate to call me. I look forward to following your patient along with you.       Sincerely,     Silvestre Kohler DO      CC: No Recipients  ______________________________________________________________________________________    ** Please excuse any errors in grammar or translation related to this dictation. Voice recognition software was utilized to prepare this document. **    Assessment & Plan:  Clinical presentation is most consistent with advanced right knee arthritis. Discuss with patient the nature of this disease and how it can be progressive.  Discuss non-operative treatment options to include maintaining a healthy weight, rehab program to improve quad & hamstring strength (given HEP handout today and instructed to perform 3-4x/week), activity modifications as needed, prescription and otc analgesics, use of cane,  corticosteroid injection, viscosupplementation injection. Patient has not interest in pursuing surgical intervention at this time.   Given current symptoms and failure to respond to other treatments to date, patient elects for treatment with CSI as below.  Given elevated HbA1c, did caution patient she may she transient increase in blood glucose the next 7-10 days. Encouraged her to better control her DM as if wanting surgery in future, HbA1c needs to be under 7.5%.   Informed patient that steroid injection can be repeated every 3 or more months as symptoms dictate. For intermittent symptoms can utilize over-the-counter NSAID or acetaminophen, ice pack or heating pad. Follow-up in 3 months or as needed. All questions  answered and patient is agreeable to this plan.     Copy today's encounter sent to patient's PCP Dr. D'Amico.      Chief complaint:  Right knee pain  HPI:  64y/o patient, hx of T2DM and HLD,  presents with right knee pain.  This complaint has been ongoing for a year.  No mechanism of injury reported at onset. Symptoms have progressively worsened with time.  Pain is most prominent at medial knee. Feels there is always a bit of swelling.  It is associated with sensation of stiffness, loss of joint motion, crackling/popping sensation.  No reported sensory changes or weakness, joint locking, or feeling of instability.  Symptoms are aggravated by activity including walking and bending. To date, patient has tried a variety of treatments to include multiple topical creams, ibuprofen, knee brace with little sustained effect. She has never had any injections in the knee.  Denies previous surgery to this site.     Patient previously saw her PCP for this and was referred to PT and Ortho for further eval and management.  Did not complete PT.       Exam:  RIGHT Knee examined. Small effusion, no ecchymosis, no erythema.  AROM from 5 to 110 deg with 5/5 strength. SILT overlying knee. Motion crepitus present. Tenderness along medial joint line.  No popliteal mass palpated. Negative anterior and posterior drawer.  No laxity to varus or valgus stress at 0 or 30 deg.  No patellar apprehension.      General Exam:  Constitutional - NAD, AAO x 3, conversing appropriately.  HEENT- Normocephalic and atraumatic. EOMI, PERRLA, No scleral icterus. No facial deformities. Hearing grossly normal.  Lungs - Breathing non-labored with normal rate. No accessory muscle use.  CV - Extremities warm and well-perfused, brisk capillary refill present.   Neuro - CN II-XII grossly intact.    Results:  X-rays of right knee obtained May 2023 independent read as advanced degenerative changes of the medial and patellofemoral compartments with mild to moderate  changes of the lateral apartment.    Component      Latest Ref Rng 12/1/2023   Hemoglobin A1C      see below % 8.3 (H)    Estimated Average Glucose      Not Established mg/dL 192      Procedure:  Patient ID: Keith Fregoso is a 63 y.o. female.    L Inj/Asp: R knee on 3/20/2024 10:08 AM  Indications: pain  Details: 25 G needle, anterolateral approach  Medications: 40 mg triamcinolone acetonide 40 mg/mL; 4 mL lidocaine 10 mg/mL (1 %)  Outcome: tolerated well, no immediate complications    Procedure risk factors to include increased pain, bleeding, infection, neurovascular injury, soft tissue injury, transient elevation of blood glucose and blood pressure, and adverse reaction to medication were discussed with the patient. Patient understands there is a moderate risk of morbidity from undergoing the procedure.    Procedure, treatment alternatives, risks and benefits explained, specific risks discussed. Consent was given by the patient. Immediately prior to procedure a time out was called to verify the correct patient, procedure, equipment, support staff and site/side marked as required. Patient was prepped and draped in the usual sterile fashion.

## 2024-03-20 NOTE — PROGRESS NOTES
** Please excuse any errors in grammar or translation related to this dictation. Voice recognition software was utilized to prepare this document. **    Assessment & Plan:  Clinical presentation is most consistent with advanced right knee arthritis. Discuss with patient the nature of this disease and how it can be progressive.  Discuss non-operative treatment options to include maintaining a healthy weight, rehab program to improve quad & hamstring strength (given HEP handout today and instructed to perform 3-4x/week), activity modifications as needed, prescription and otc analgesics, use of cane,  corticosteroid injection, viscosupplementation injection. Patient has not interest in pursuing surgical intervention at this time.   Given current symptoms and failure to respond to other treatments to date, patient elects for treatment with CSI as below.  Given elevated HbA1c, did caution patient she may she transient increase in blood glucose the next 7-10 days. Encouraged her to better control her DM as if wanting surgery in future, HbA1c needs to be under 7.5%.   Informed patient that steroid injection can be repeated every 3 or more months as symptoms dictate. For intermittent symptoms can utilize over-the-counter NSAID or acetaminophen, ice pack or heating pad. Follow-up in 3 months or as needed. All questions answered and patient is agreeable to this plan.     Copy today's encounter sent to patient's PCP Dr. D'Amico.      Chief complaint:  Right knee pain  HPI:  64y/o patient, hx of T2DM and HLD,  presents with right knee pain.  This complaint has been ongoing for a year.  No mechanism of injury reported at onset. Symptoms have progressively worsened with time.  Pain is most prominent at medial knee. Feels there is always a bit of swelling.  It is associated with sensation of stiffness, loss of joint motion, crackling/popping sensation.  No reported sensory changes or weakness, joint locking, or feeling of  instability.  Symptoms are aggravated by activity including walking and bending. To date, patient has tried a variety of treatments to include multiple topical creams, ibuprofen, knee brace with little sustained effect. She has never had any injections in the knee.  Denies previous surgery to this site.     Patient previously saw her PCP for this and was referred to PT and Ortho for further eval and management.  Did not complete PT.       Exam:  RIGHT Knee examined. Small effusion, no ecchymosis, no erythema.  AROM from 5 to 110 deg with 5/5 strength. SILT overlying knee. Motion crepitus present. Tenderness along medial joint line.  No popliteal mass palpated. Negative anterior and posterior drawer.  No laxity to varus or valgus stress at 0 or 30 deg.  No patellar apprehension.      General Exam:  Constitutional - NAD, AAO x 3, conversing appropriately.  HEENT- Normocephalic and atraumatic. EOMI, PERRLA, No scleral icterus. No facial deformities. Hearing grossly normal.  Lungs - Breathing non-labored with normal rate. No accessory muscle use.  CV - Extremities warm and well-perfused, brisk capillary refill present.   Neuro - CN II-XII grossly intact.    Results:  X-rays of right knee obtained May 2023 independent read as advanced degenerative changes of the medial and patellofemoral compartments with mild to moderate changes of the lateral apartment.    Component      Latest Ref Rng 12/1/2023   Hemoglobin A1C      see below % 8.3 (H)    Estimated Average Glucose      Not Established mg/dL 192      Procedure:  Patient ID: Keith Fregoso is a 63 y.o. female.    L Inj/Asp: R knee on 3/20/2024 10:08 AM  Indications: pain  Details: 25 G needle, anterolateral approach  Medications: 40 mg triamcinolone acetonide 40 mg/mL; 4 mL lidocaine 10 mg/mL (1 %)  Outcome: tolerated well, no immediate complications    Procedure risk factors to include increased pain, bleeding, infection, neurovascular injury, soft tissue injury,  transient elevation of blood glucose and blood pressure, and adverse reaction to medication were discussed with the patient. Patient understands there is a moderate risk of morbidity from undergoing the procedure.    Procedure, treatment alternatives, risks and benefits explained, specific risks discussed. Consent was given by the patient. Immediately prior to procedure a time out was called to verify the correct patient, procedure, equipment, support staff and site/side marked as required. Patient was prepped and draped in the usual sterile fashion.

## 2024-03-21 ENCOUNTER — OFFICE VISIT (OUTPATIENT)
Dept: PRIMARY CARE | Facility: CLINIC | Age: 64
End: 2024-03-21
Payer: MEDICARE

## 2024-03-21 VITALS
SYSTOLIC BLOOD PRESSURE: 127 MMHG | HEIGHT: 60 IN | HEART RATE: 78 BPM | BODY MASS INDEX: 32.98 KG/M2 | WEIGHT: 168 LBS | OXYGEN SATURATION: 94 % | DIASTOLIC BLOOD PRESSURE: 68 MMHG

## 2024-03-21 DIAGNOSIS — M25.561 CHRONIC PAIN OF RIGHT KNEE: ICD-10-CM

## 2024-03-21 DIAGNOSIS — F41.9 ANXIETY: ICD-10-CM

## 2024-03-21 DIAGNOSIS — I10 ESSENTIAL HYPERTENSION: ICD-10-CM

## 2024-03-21 DIAGNOSIS — E55.9 VITAMIN D DEFICIENCY: ICD-10-CM

## 2024-03-21 DIAGNOSIS — K21.9 GASTROESOPHAGEAL REFLUX DISEASE, UNSPECIFIED WHETHER ESOPHAGITIS PRESENT: ICD-10-CM

## 2024-03-21 DIAGNOSIS — F41.9 ANXIETY DISORDER, UNSPECIFIED TYPE: ICD-10-CM

## 2024-03-21 DIAGNOSIS — E78.5 HYPERLIPIDEMIA, UNSPECIFIED HYPERLIPIDEMIA TYPE: ICD-10-CM

## 2024-03-21 DIAGNOSIS — E11.9 TYPE 2 DIABETES MELLITUS WITHOUT COMPLICATION, WITHOUT LONG-TERM CURRENT USE OF INSULIN (MULTI): Primary | ICD-10-CM

## 2024-03-21 DIAGNOSIS — G89.29 CHRONIC PAIN OF RIGHT KNEE: ICD-10-CM

## 2024-03-21 PROCEDURE — 3078F DIAST BP <80 MM HG: CPT | Performed by: STUDENT IN AN ORGANIZED HEALTH CARE EDUCATION/TRAINING PROGRAM

## 2024-03-21 PROCEDURE — G2211 COMPLEX E/M VISIT ADD ON: HCPCS | Performed by: STUDENT IN AN ORGANIZED HEALTH CARE EDUCATION/TRAINING PROGRAM

## 2024-03-21 PROCEDURE — 1160F RVW MEDS BY RX/DR IN RCRD: CPT | Performed by: STUDENT IN AN ORGANIZED HEALTH CARE EDUCATION/TRAINING PROGRAM

## 2024-03-21 PROCEDURE — 1036F TOBACCO NON-USER: CPT | Performed by: STUDENT IN AN ORGANIZED HEALTH CARE EDUCATION/TRAINING PROGRAM

## 2024-03-21 PROCEDURE — 1159F MED LIST DOCD IN RCRD: CPT | Performed by: STUDENT IN AN ORGANIZED HEALTH CARE EDUCATION/TRAINING PROGRAM

## 2024-03-21 PROCEDURE — 99214 OFFICE O/P EST MOD 30 MIN: CPT | Performed by: STUDENT IN AN ORGANIZED HEALTH CARE EDUCATION/TRAINING PROGRAM

## 2024-03-21 PROCEDURE — 3074F SYST BP LT 130 MM HG: CPT | Performed by: STUDENT IN AN ORGANIZED HEALTH CARE EDUCATION/TRAINING PROGRAM

## 2024-03-21 PROCEDURE — 4010F ACE/ARB THERAPY RXD/TAKEN: CPT | Performed by: STUDENT IN AN ORGANIZED HEALTH CARE EDUCATION/TRAINING PROGRAM

## 2024-03-21 RX ORDER — ALPRAZOLAM 0.5 MG/1
0.5 TABLET ORAL 3 TIMES DAILY PRN
Qty: 90 TABLET | Refills: 0 | Status: SHIPPED | OUTPATIENT
Start: 2024-03-21

## 2024-03-21 RX ORDER — ERGOCALCIFEROL 1.25 MG/1
1 CAPSULE ORAL
Qty: 12 CAPSULE | Refills: 3 | Status: SHIPPED | OUTPATIENT
Start: 2024-03-21

## 2024-03-21 RX ORDER — OLMESARTAN MEDOXOMIL 20 MG/1
20 TABLET ORAL DAILY
Qty: 90 TABLET | Refills: 3 | Status: SHIPPED | OUTPATIENT
Start: 2024-03-21

## 2024-03-21 ASSESSMENT — PATIENT HEALTH QUESTIONNAIRE - PHQ9
SUM OF ALL RESPONSES TO PHQ9 QUESTIONS 1 AND 2: 0
2. FEELING DOWN, DEPRESSED OR HOPELESS: NOT AT ALL
1. LITTLE INTEREST OR PLEASURE IN DOING THINGS: NOT AT ALL

## 2024-03-21 ASSESSMENT — ENCOUNTER SYMPTOMS
OCCASIONAL FEELINGS OF UNSTEADINESS: 0
LOSS OF SENSATION IN FEET: 0
DEPRESSION: 0

## 2024-03-21 NOTE — PROGRESS NOTES
63-year-old female presenting for follow-up on chronic conditions:    HTN  Stable, tolerating current regimen well.     DMII  Stable, tolerating current regimen well.  Following with [facility] pharmacist.     Anxiety  Stable on current regimen.     Right knee pain  Saw orthopedics recently, intra-articular injection.  Has had some improvement.  But is having difficulty walking long distances still.    SocHx:   - Smoking: Never    12 point ROS reviewed and negative other than as stated in HPI      General: Alert, oriented, pleasant, in no acute distress  HEENT:      Head: normocephalic, atraumatic;      eyes: EOMI, no scleral icterus;   Neck: soft, supple, non-tender, no masses appreciated  CV: Heart with regular rate and rhythm, normal S1/S2, no murmurs  Lungs: CTAB without wheezing, rhonchi or rales; good respiratory effort, no increased work of breathing  Neuro: Cranial nerves grossly intact; alert and oriented  Psych: Appropriate mood and affect    #HTN  -At goal in office  -Continue olmesartan 20 mg daily  -Repeat CMP     #DMII  -Last A1c 8.3, 12/2023  -UTD diabetic eye exam  - Diabetic foot exam negative in office  -Currently on metformin 1000 mg twice daily, Trulicity 4.5 mg weekly, 10 mg daily  -Continue to follow with [facility] pharmacist  -Repeat A1c, albumin    #HLD  - Continue atorvastatin 40 mg daily  - Repeat lipid panel    #Anxiety  -Continue citalopram 20 mg daily, alprazolam 0.5 mg 3 times daily as needed  -CSA up-to-date     #R knee pain  -Plain film with moderate arthritis  - Continue to follow with orthopedics  - Disability placard printed    F/U 3 months, sooner if indicated, Medicare    Chris D'Amico, DO

## 2024-03-22 ENCOUNTER — TELEMEDICINE (OUTPATIENT)
Dept: PHARMACY | Facility: HOSPITAL | Age: 64
End: 2024-03-22
Payer: MEDICARE

## 2024-03-22 ENCOUNTER — LAB (OUTPATIENT)
Dept: LAB | Facility: LAB | Age: 64
End: 2024-03-22
Payer: MEDICARE

## 2024-03-22 DIAGNOSIS — I10 ESSENTIAL HYPERTENSION: ICD-10-CM

## 2024-03-22 DIAGNOSIS — F41.9 ANXIETY DISORDER, UNSPECIFIED TYPE: ICD-10-CM

## 2024-03-22 DIAGNOSIS — E11.9 DIABETES MELLITUS TYPE 2 WITHOUT RETINOPATHY (MULTI): ICD-10-CM

## 2024-03-22 DIAGNOSIS — G89.29 CHRONIC PAIN OF RIGHT KNEE: ICD-10-CM

## 2024-03-22 DIAGNOSIS — E11.9 TYPE 2 DIABETES MELLITUS WITHOUT COMPLICATION, WITHOUT LONG-TERM CURRENT USE OF INSULIN (MULTI): ICD-10-CM

## 2024-03-22 DIAGNOSIS — M25.561 CHRONIC PAIN OF RIGHT KNEE: ICD-10-CM

## 2024-03-22 LAB
ALBUMIN SERPL BCP-MCNC: 4.3 G/DL (ref 3.4–5)
ALP SERPL-CCNC: 65 U/L (ref 33–136)
ALT SERPL W P-5'-P-CCNC: 21 U/L (ref 7–45)
ANION GAP SERPL CALC-SCNC: 15 MMOL/L (ref 10–20)
AST SERPL W P-5'-P-CCNC: 16 U/L (ref 9–39)
BILIRUB SERPL-MCNC: 0.6 MG/DL (ref 0–1.2)
BUN SERPL-MCNC: 23 MG/DL (ref 6–23)
CALCIUM SERPL-MCNC: 9.9 MG/DL (ref 8.6–10.6)
CHLORIDE SERPL-SCNC: 106 MMOL/L (ref 98–107)
CHOLEST SERPL-MCNC: 168 MG/DL (ref 0–199)
CHOLESTEROL/HDL RATIO: 3.5
CO2 SERPL-SCNC: 25 MMOL/L (ref 21–32)
CREAT SERPL-MCNC: 0.69 MG/DL (ref 0.5–1.05)
EGFRCR SERPLBLD CKD-EPI 2021: >90 ML/MIN/1.73M*2
ERYTHROCYTE [DISTWIDTH] IN BLOOD BY AUTOMATED COUNT: 13.2 % (ref 11.5–14.5)
EST. AVERAGE GLUCOSE BLD GHB EST-MCNC: 166 MG/DL
GLUCOSE SERPL-MCNC: 139 MG/DL (ref 74–99)
HBA1C MFR BLD: 7.4 %
HCT VFR BLD AUTO: 40.8 % (ref 36–46)
HDLC SERPL-MCNC: 48.4 MG/DL
HGB BLD-MCNC: 12.9 G/DL (ref 12–16)
LDLC SERPL CALC-MCNC: 96 MG/DL
MCH RBC QN AUTO: 28.4 PG (ref 26–34)
MCHC RBC AUTO-ENTMCNC: 31.6 G/DL (ref 32–36)
MCV RBC AUTO: 90 FL (ref 80–100)
NON HDL CHOLESTEROL: 120 MG/DL (ref 0–149)
NRBC BLD-RTO: 0 /100 WBCS (ref 0–0)
PLATELET # BLD AUTO: 299 X10*3/UL (ref 150–450)
POTASSIUM SERPL-SCNC: 4.7 MMOL/L (ref 3.5–5.3)
PROT SERPL-MCNC: 7.1 G/DL (ref 6.4–8.2)
RBC # BLD AUTO: 4.55 X10*6/UL (ref 4–5.2)
SODIUM SERPL-SCNC: 141 MMOL/L (ref 136–145)
TRIGL SERPL-MCNC: 116 MG/DL (ref 0–149)
VLDL: 23 MG/DL (ref 0–40)
WBC # BLD AUTO: 11.7 X10*3/UL (ref 4.4–11.3)

## 2024-03-22 PROCEDURE — 36415 COLL VENOUS BLD VENIPUNCTURE: CPT

## 2024-03-22 PROCEDURE — 80053 COMPREHEN METABOLIC PANEL: CPT

## 2024-03-22 PROCEDURE — 83036 HEMOGLOBIN GLYCOSYLATED A1C: CPT

## 2024-03-22 PROCEDURE — 85027 COMPLETE CBC AUTOMATED: CPT

## 2024-03-22 PROCEDURE — 80061 LIPID PANEL: CPT

## 2024-03-22 RX ORDER — DULAGLUTIDE 4.5 MG/.5ML
4.5 INJECTION, SOLUTION SUBCUTANEOUS
Qty: 6 ML | Refills: 3 | Status: SHIPPED | OUTPATIENT
Start: 2024-03-22

## 2024-03-22 NOTE — PROGRESS NOTES
Pharmacist Clinic: Diabetes Management  Keith Fregoso is a 63 y.o. female was referred to Clinical Pharmacy Team for diabetes management.     Referring Provider: Christopher D'Amico, DO     HISTORY OF PRESENT ILLNESS  Approximate Date of Diagnosis: approx 20 years ago  Diet: patient states she knows she does not eat healthy, she needs to work on it   Exercise Routine: minimal given her leg problems     LAB REVIEW   Glucose (mg/dL)   Date Value   03/22/2024 139 (H)   12/01/2023 124 (H)   08/11/2023 146 (H)   05/12/2023 175 (H)   02/08/2023 150 (H)     Hemoglobin A1C (%)   Date Value   03/22/2024 7.4 (H)   12/01/2023 8.3 (H)   08/11/2023 7.9 (A)   05/12/2023 8.3 (A)   02/08/2023 8.2 (A)     Bicarbonate (mmol/L)   Date Value   03/22/2024 25   12/01/2023 28   08/11/2023 28   05/12/2023 25   02/08/2023 31     Urea Nitrogen (mg/dL)   Date Value   03/22/2024 23   12/01/2023 18   08/11/2023 20   05/12/2023 18   02/08/2023 18     Creatinine (mg/dL)   Date Value   03/22/2024 0.69   12/01/2023 0.68   08/11/2023 0.62   05/12/2023 0.57   02/08/2023 0.59     Lab Results   Component Value Date    CHOL 168 03/22/2024    CHOL 153 12/01/2023    CHOL 144 08/11/2023     Lab Results   Component Value Date    HDL 48.4 03/22/2024    HDL 45.5 12/01/2023    HDL 38.9 (A) 08/11/2023     Lab Results   Component Value Date    LDLCALC 96 03/22/2024    LDLCALC 84 12/01/2023     Lab Results   Component Value Date    TRIG 116 03/22/2024    TRIG 120 12/01/2023    TRIG 179 (H) 08/11/2023     DIABETES ASSESSMENT    CURRENT PHARMACOTHERAPY  - metformin 500 mg twice daily   - trulicity 4.5 mg under the skin once weekly  - farxiga 10 mg once daily    SECONDARY PREVENTION  - Statin? Yes  - ACE-I/ARB? Yes, patient appears to be non-adherent     SMBG MEASUREMENTS: AM readings around 140-150     DISCUSSION:  A1C came back at 7.4%, this is improved from our last discussed!   Patient is tolerating trulicity (4.5 mg), she denies any side effects  Patient is  tolerating farxiga without issues, she denies any side effects or  infections     RECOMMENDATIONS/PLAN  1. Patients diabetes is improving with most recent A1c of 7.4 % (goal < 7 %).   - Continue all meds under the continuation of care with the referring provider and clinical pharmacy team.    Clinical Pharmacist follow up: 3 months    Thank you,  Alejandra Espinal, PharmD    Verbal consent to manage patient's drug therapy was obtained from the patient. They were informed they may decline to participate or withdraw from participation in pharmacy services at any time.

## 2024-03-22 NOTE — LETTER
March 26, 2024     Keith Fregoso  [address] [place] [address]      Dear Ms. Fregoso:    Below are the results from your recent visit:      Hemoglobin A1c at 7.4, best that it has been a year, still not quite at diabetic goal of 7, will continue to follow with Alejandra and maximize/optimize medication.  Continue to improve diet, particularly reduction in carbohydrates.     LDL at 96, still above diabetic goal of 70, if taking atorvastatin 40 mg every day, may consider going up to 80 mg daily, can start by doubling pills that she currently has.     Remaining labs unremarkable           If you have any questions or concerns, please don't hesitate to call.

## 2024-03-25 NOTE — RESULT ENCOUNTER NOTE
Hemoglobin A1c at 7.4, best that it has been a year, still not quite at diabetic goal of 7, will continue to follow with Alejandra and maximize/optimize medication.  Continue to improve diet, particularly reduction in carbohydrates.    LDL at 96, still above diabetic goal of 70, if taking atorvastatin 40 mg every day, may consider going up to 80 mg daily, can start by doubling pills that she currently has.    Remaining labs unremarkable

## 2024-03-26 ENCOUNTER — TELEPHONE (OUTPATIENT)
Dept: PRIMARY CARE | Facility: CLINIC | Age: 64
End: 2024-03-26
Payer: MEDICARE

## 2024-03-26 NOTE — TELEPHONE ENCOUNTER
----- Message from Christopher D'Amico, DO sent at 3/25/2024  2:34 PM EDT -----  Hemoglobin A1c at 7.4, best that it has been a year, still not quite at diabetic goal of 7, will continue to follow with Alejandra and maximize/optimize medication.  Continue to improve diet, particularly reduction in carbohydrates.    LDL at 96, still above diabetic goal of 70, if taking atorvastatin 40 mg every day, may consider going up to 80 mg daily, can start by doubling pills that she currently has.    Remaining labs unremarkable

## 2024-03-26 NOTE — TELEPHONE ENCOUNTER
Result Communication    Resulted Orders   CBC   Result Value Ref Range    WBC 11.7 (H) 4.4 - 11.3 x10*3/uL    nRBC 0.0 0.0 - 0.0 /100 WBCs    RBC 4.55 4.00 - 5.20 x10*6/uL    Hemoglobin 12.9 12.0 - 16.0 g/dL    Hematocrit 40.8 36.0 - 46.0 %    MCV 90 80 - 100 fL    MCH 28.4 26.0 - 34.0 pg    MCHC 31.6 (L) 32.0 - 36.0 g/dL    RDW 13.2 11.5 - 14.5 %    Platelets 299 150 - 450 x10*3/uL   Lipid Panel   Result Value Ref Range    Cholesterol 168 0 - 199 mg/dL      Comment:            Age      Desirable   Borderline High   High     0-19 Y     0 - 169       170 - 199     >/= 200    20-24 Y     0 - 189       190 - 224     >/= 225         >24 Y     0 - 199       200 - 239     >/= 240   **All ranges are based on fasting samples. Specific   therapeutic targets will vary based on patient-specific   cardiac risk.    Pediatric guidelines reference:Pediatrics 2011, 128(S5).Adult guidelines reference: NCEP ATPIII Guidelines,GEN 2001, 258:2486-97    Venipuncture immediately after or during the administration of Metamizole may lead to falsely low results. Testing should be performed immediately prior to Metamizole dosing.    HDL-Cholesterol 48.4 mg/dL      Comment:        Age       Very Low   Low     Normal    High    0-19 Y    < 35      < 40     40-45     ----  20-24 Y    ----     < 40      >45      ----        >24 Y      ----     < 40     40-60      >60      Cholesterol/HDL Ratio 3.5       Comment:        Ref Values  Desirable  < 3.4  High Risk  > 5.0    LDL Calculated 96 <=99 mg/dL      Comment:                                  Near   Borderline      AGE      Desirable  Optimal    High     High     Very High     0-19 Y     0 - 109     ---    110-129   >/= 130     ----    20-24 Y     0 - 119     ---    120-159   >/= 160     ----      >24 Y     0 -  99   100-129  130-159   160-189     >/=190      VLDL 23 0 - 40 mg/dL    Triglycerides 116 0 - 149 mg/dL      Comment:         Age         Desirable   Borderline High   High     Very  High   0 D-90 D    19 - 174         ----         ----        ----  91 D- 9 Y     0 -  74        75 -  99     >/= 100      ----    10-19 Y     0 -  89        90 - 129     >/= 130      ----    20-24 Y     0 - 114       115 - 149     >/= 150      ----         >24 Y     0 - 149       150 - 199    200- 499    >/= 500    Venipuncture immediately after or during the administration of Metamizole may lead to falsely low results. Testing should be performed immediately prior to Metamizole dosing.    Non HDL Cholesterol 120 0 - 149 mg/dL      Comment:            Age       Desirable   Borderline High   High     Very High     0-19 Y     0 - 119       120 - 144     >/= 145    >/= 160    20-24 Y     0 - 149       150 - 189     >/= 190      ----         >24 Y    30 mg/dL above LDL Cholesterol goal     Comprehensive Metabolic Panel   Result Value Ref Range    Glucose 139 (H) 74 - 99 mg/dL    Sodium 141 136 - 145 mmol/L    Potassium 4.7 3.5 - 5.3 mmol/L    Chloride 106 98 - 107 mmol/L    Bicarbonate 25 21 - 32 mmol/L    Anion Gap 15 10 - 20 mmol/L    Urea Nitrogen 23 6 - 23 mg/dL    Creatinine 0.69 0.50 - 1.05 mg/dL    eGFR >90 >60 mL/min/1.73m*2      Comment:      Calculations of estimated GFR are performed using the 2021 CKD-EPI Study Refit equation without the race variable for the IDMS-Traceable creatinine methods.  https://jasn.asnjournals.org/content/early/2021/09/22/ASN.0127595295    Calcium 9.9 8.6 - 10.6 mg/dL    Albumin 4.3 3.4 - 5.0 g/dL    Alkaline Phosphatase 65 33 - 136 U/L    Total Protein 7.1 6.4 - 8.2 g/dL    AST 16 9 - 39 U/L    Bilirubin, Total 0.6 0.0 - 1.2 mg/dL    ALT 21 7 - 45 U/L      Comment:      Patients treated with Sulfasalazine may generate falsely decreased results for ALT.   Hemoglobin A1C   Result Value Ref Range    Hemoglobin A1C 7.4 (H) see below %    Estimated Average Glucose 166 Not Established mg/dL    Narrative    Diagnosis of Diabetes-Adults  Non-Diabetic: < or = 5.6%  Increased risk for  developing diabetes: 5.7-6.4%  Diagnostic of diabetes: > or = 6.5%    Monitoring of Diabetes  Age (y)....................... Therapeutic Goal (%)  Adults: >18.........................<7.0  Pediatrics: 13-18...................<7.5  Pediatrics: 7-12....................<8.0  Pediatrics: 0-6..................... 7.5-8.5    American Diabetes Association. Diabetes Care 33(S1), Jan 2010           11:26 AM      Results were successfully communicated with the patient and they acknowledged their understanding.

## 2024-04-01 DIAGNOSIS — R42 VERTIGO: ICD-10-CM

## 2024-04-02 RX ORDER — MECLIZINE HYDROCHLORIDE 25 MG/1
TABLET ORAL
Qty: 60 TABLET | Refills: 1 | Status: SHIPPED | OUTPATIENT
Start: 2024-04-02 | End: 2024-04-03 | Stop reason: SDUPTHER

## 2024-04-03 DIAGNOSIS — R42 VERTIGO: ICD-10-CM

## 2024-04-03 RX ORDER — MECLIZINE HYDROCHLORIDE 25 MG/1
TABLET ORAL
Qty: 180 TABLET | Refills: 1 | Status: SHIPPED | OUTPATIENT
Start: 2024-04-03

## 2024-04-09 PROCEDURE — RXMED WILLOW AMBULATORY MEDICATION CHARGE

## 2024-04-11 ENCOUNTER — PHARMACY VISIT (OUTPATIENT)
Dept: PHARMACY | Facility: CLINIC | Age: 64
End: 2024-04-11
Payer: MEDICARE

## 2024-04-24 DIAGNOSIS — E78.00 HYPERCHOLESTEROLEMIA: ICD-10-CM

## 2024-04-24 DIAGNOSIS — E11.69 TYPE 2 DIABETES MELLITUS WITH OTHER SPECIFIED COMPLICATION, UNSPECIFIED WHETHER LONG TERM INSULIN USE (MULTI): ICD-10-CM

## 2024-04-24 RX ORDER — METFORMIN HYDROCHLORIDE 500 MG/1
500 TABLET ORAL 2 TIMES DAILY
Qty: 180 TABLET | Refills: 1 | Status: SHIPPED | OUTPATIENT
Start: 2024-04-24

## 2024-04-24 RX ORDER — ATORVASTATIN CALCIUM 40 MG/1
40 TABLET, FILM COATED ORAL NIGHTLY
Qty: 90 TABLET | Refills: 1 | Status: SHIPPED | OUTPATIENT
Start: 2024-04-24

## 2024-04-25 PROCEDURE — RXMED WILLOW AMBULATORY MEDICATION CHARGE

## 2024-05-02 ENCOUNTER — PHARMACY VISIT (OUTPATIENT)
Dept: PHARMACY | Facility: CLINIC | Age: 64
End: 2024-05-02
Payer: MEDICARE

## 2024-06-05 ENCOUNTER — APPOINTMENT (OUTPATIENT)
Dept: PHARMACY | Facility: HOSPITAL | Age: 64
End: 2024-06-05
Payer: MEDICARE

## 2024-06-12 ENCOUNTER — APPOINTMENT (OUTPATIENT)
Dept: ORTHOPEDIC SURGERY | Facility: CLINIC | Age: 64
End: 2024-06-12
Payer: MEDICARE

## 2024-06-12 ENCOUNTER — HOSPITAL ENCOUNTER (OUTPATIENT)
Dept: RADIOLOGY | Facility: EXTERNAL LOCATION | Age: 64
Discharge: HOME | End: 2024-06-12

## 2024-06-12 DIAGNOSIS — M17.11 LOCALIZED OSTEOARTHRITIS OF RIGHT KNEE: Primary | ICD-10-CM

## 2024-06-12 PROCEDURE — 20611 DRAIN/INJ JOINT/BURSA W/US: CPT | Performed by: FAMILY MEDICINE

## 2024-06-12 PROCEDURE — 99214 OFFICE O/P EST MOD 30 MIN: CPT | Performed by: FAMILY MEDICINE

## 2024-06-12 PROCEDURE — 1036F TOBACCO NON-USER: CPT | Performed by: FAMILY MEDICINE

## 2024-06-12 PROCEDURE — 4010F ACE/ARB THERAPY RXD/TAKEN: CPT | Performed by: FAMILY MEDICINE

## 2024-06-12 PROCEDURE — 1159F MED LIST DOCD IN RCRD: CPT | Performed by: FAMILY MEDICINE

## 2024-06-12 PROCEDURE — 3048F LDL-C <100 MG/DL: CPT | Performed by: FAMILY MEDICINE

## 2024-06-12 PROCEDURE — 3051F HG A1C>EQUAL 7.0%<8.0%: CPT | Performed by: FAMILY MEDICINE

## 2024-06-12 RX ORDER — TRIAMCINOLONE ACETONIDE 40 MG/ML
40 INJECTION, SUSPENSION INTRA-ARTICULAR; INTRAMUSCULAR
Status: COMPLETED | OUTPATIENT
Start: 2024-06-12 | End: 2024-06-12

## 2024-06-12 RX ORDER — LIDOCAINE HYDROCHLORIDE 10 MG/ML
4 INJECTION INFILTRATION; PERINEURAL
Status: COMPLETED | OUTPATIENT
Start: 2024-06-12 | End: 2024-06-12

## 2024-06-12 RX ORDER — MELOXICAM 15 MG/1
15 TABLET ORAL DAILY PRN
Qty: 30 TABLET | Refills: 1 | Status: SHIPPED | OUTPATIENT
Start: 2024-06-12

## 2024-06-12 NOTE — PROGRESS NOTES
** Please excuse any errors in grammar or translation related to this dictation. Voice recognition software was utilized to prepare this document. **    Assessment & Plan:  Patient here for ongoing management of advanced right knee arthritis. Unfortunately last CSI was minimally effective.  We again reviewed treatment options to include:  - Maintaining a healthy weight: Every pound of bodyweight is about 4-5 pounds through the lower extremity. Additionally to be candidate for joint replacement surgery in the future, BMI needs to be <40%.  - Exercise program to strengthen supportive musculature.    - Activity modifications as needed to include use of cane/walker or braces. Strongly encouraged her to use cane, especially with upcoming trip to [place].  - Prescription and otc analgesics to include but not limited to APAP, ibuprofen, naproxen, diclofenac gel. Will start on trials of meloxicam and capsaicin cream to mitigate pain.   - Steroid injection. Wanted to attempt again to hopefully get more prolonged response.  - Hyaluronic acid injection. Will start prior auth through insurance to complete.  - After failing non-operative measures, may ultimately require arthroplasty. Patient defers referral at this time.  Informed patient that steroid injection can be repeated every 3 or more months as symptoms dictate.  All questions answered and patient is agreeable to this plan.       Chief complaint:  Right knee pain    HPI:  6/12/24: Patient following up today for right knee pain secondary to advanced arthritis.  Unfortunately steroid injection completed in March only helped for about 2 weeks.  Pain has not been well-controlled since.  Does use Advil occasionally.    3/20/24: 64y/o patient, hx of T2DM and HLD,  presents with right knee pain.  This complaint has been ongoing for a year.  No mechanism of injury reported at onset. Symptoms have progressively worsened with time.  Pain is most prominent at medial knee. Feels  there is always a bit of swelling.  It is associated with sensation of stiffness, loss of joint motion, crackling/popping sensation.  No reported sensory changes or weakness, joint locking, or feeling of instability.  Symptoms are aggravated by activity including walking and bending. To date, patient has tried a variety of treatments to include multiple topical creams, ibuprofen, knee brace with little sustained effect. She has never had any injections in the knee.  Denies previous surgery to this site.   Patient previously saw her PCP for this and was referred to PT and Ortho for further eval and management.  Did not complete PT.       Exam:  RIGHT Knee examined. No effusion, no ecchymosis, no erythema.  AROM from 5 to 110 deg with 5/5 strength. SILT overlying knee. Motion crepitus present. Tenderness along medial joint line.  No popliteal mass palpated. Negative anterior and posterior drawer.  No laxity to varus or valgus stress at 0 or 30 deg.  No patellar apprehension.      General Exam:  Constitutional - NAD, AAO x 3, conversing appropriately.  HEENT- Normocephalic and atraumatic. EOMI, PERRLA, No scleral icterus. No facial deformities. Hearing grossly normal.  Lungs - Breathing non-labored with normal rate. No accessory muscle use.  CV - Extremities warm and well-perfused, brisk capillary refill present.   Neuro - CN II-XII grossly intact.    Results:  X-rays of right knee obtained May 2023: advanced degenerative changes of the medial and patellofemoral compartments with mild to moderate changes of the lateral apartment.    Component      Latest Ref Rng 3/22/2024   Hemoglobin A1C      see below % 7.4 (H)    Estimated Average Glucose      Not Established mg/dL 166      Patient ID: Keith Fregoso is a 63 y.o. female.    L Inj/Asp: R knee on 6/12/2024 4:11 PM  Indications: pain  Details: 25 G needle, ultrasound-guided superolateral approach  Medications: 40 mg triamcinolone acetonide 40 mg/mL; 4 mL lidocaine 10  mg/mL (1 %)  Outcome: tolerated well, no immediate complications    Procedure risk factors to include increased pain, bleeding, infection, neurovascular injury, soft tissue injury, transient elevation of blood glucose and blood pressure, and adverse reaction to medication were discussed with the patient. Patient understands there is a moderate risk of morbidity from undergoing the procedure.    Procedure, treatment alternatives, risks and benefits explained, specific risks discussed. Consent was given by the patient. Immediately prior to procedure a time out was called to verify the correct patient, procedure, equipment, support staff and site/side marked as required. Patient was prepped and draped in the usual sterile fashion.

## 2024-06-13 ENCOUNTER — APPOINTMENT (OUTPATIENT)
Dept: PRIMARY CARE | Facility: CLINIC | Age: 64
End: 2024-06-13
Payer: MEDICARE

## 2024-06-13 VITALS
WEIGHT: 168 LBS | SYSTOLIC BLOOD PRESSURE: 139 MMHG | OXYGEN SATURATION: 96 % | BODY MASS INDEX: 32.81 KG/M2 | DIASTOLIC BLOOD PRESSURE: 72 MMHG | HEART RATE: 81 BPM

## 2024-06-13 DIAGNOSIS — E11.9 TYPE 2 DIABETES MELLITUS WITHOUT COMPLICATION, WITHOUT LONG-TERM CURRENT USE OF INSULIN (MULTI): Primary | ICD-10-CM

## 2024-06-13 DIAGNOSIS — Z79.899 BENZODIAZEPINE CONTRACT EXISTS: ICD-10-CM

## 2024-06-13 DIAGNOSIS — F41.9 ANXIETY DISORDER, UNSPECIFIED TYPE: ICD-10-CM

## 2024-06-13 DIAGNOSIS — E78.00 HYPERCHOLESTEROLEMIA: ICD-10-CM

## 2024-06-13 DIAGNOSIS — E78.5 HYPERLIPIDEMIA, UNSPECIFIED HYPERLIPIDEMIA TYPE: ICD-10-CM

## 2024-06-13 DIAGNOSIS — I15.9 SECONDARY HYPERTENSION: ICD-10-CM

## 2024-06-13 DIAGNOSIS — I10 ESSENTIAL HYPERTENSION: ICD-10-CM

## 2024-06-13 DIAGNOSIS — F41.9 ANXIETY: ICD-10-CM

## 2024-06-13 PROBLEM — M25.529 ELBOW PAIN: Status: ACTIVE | Noted: 2024-06-13

## 2024-06-13 PROCEDURE — 3051F HG A1C>EQUAL 7.0%<8.0%: CPT | Performed by: STUDENT IN AN ORGANIZED HEALTH CARE EDUCATION/TRAINING PROGRAM

## 2024-06-13 PROCEDURE — 1036F TOBACCO NON-USER: CPT | Performed by: STUDENT IN AN ORGANIZED HEALTH CARE EDUCATION/TRAINING PROGRAM

## 2024-06-13 PROCEDURE — 99214 OFFICE O/P EST MOD 30 MIN: CPT | Performed by: STUDENT IN AN ORGANIZED HEALTH CARE EDUCATION/TRAINING PROGRAM

## 2024-06-13 PROCEDURE — 1160F RVW MEDS BY RX/DR IN RCRD: CPT | Performed by: STUDENT IN AN ORGANIZED HEALTH CARE EDUCATION/TRAINING PROGRAM

## 2024-06-13 PROCEDURE — 4010F ACE/ARB THERAPY RXD/TAKEN: CPT | Performed by: STUDENT IN AN ORGANIZED HEALTH CARE EDUCATION/TRAINING PROGRAM

## 2024-06-13 PROCEDURE — 3075F SYST BP GE 130 - 139MM HG: CPT | Performed by: STUDENT IN AN ORGANIZED HEALTH CARE EDUCATION/TRAINING PROGRAM

## 2024-06-13 PROCEDURE — G2211 COMPLEX E/M VISIT ADD ON: HCPCS | Performed by: STUDENT IN AN ORGANIZED HEALTH CARE EDUCATION/TRAINING PROGRAM

## 2024-06-13 PROCEDURE — 3078F DIAST BP <80 MM HG: CPT | Performed by: STUDENT IN AN ORGANIZED HEALTH CARE EDUCATION/TRAINING PROGRAM

## 2024-06-13 PROCEDURE — 1159F MED LIST DOCD IN RCRD: CPT | Performed by: STUDENT IN AN ORGANIZED HEALTH CARE EDUCATION/TRAINING PROGRAM

## 2024-06-13 PROCEDURE — 3048F LDL-C <100 MG/DL: CPT | Performed by: STUDENT IN AN ORGANIZED HEALTH CARE EDUCATION/TRAINING PROGRAM

## 2024-06-13 RX ORDER — ALPRAZOLAM 0.5 MG/1
0.5 TABLET ORAL 3 TIMES DAILY PRN
Qty: 90 TABLET | Refills: 0 | Status: SHIPPED | OUTPATIENT
Start: 2024-06-13

## 2024-06-13 RX ORDER — AMLODIPINE BESYLATE 10 MG/1
10 TABLET ORAL DAILY
Qty: 90 TABLET | Refills: 1 | Status: SHIPPED | OUTPATIENT
Start: 2024-06-13

## 2024-06-13 RX ORDER — ATORVASTATIN CALCIUM 80 MG/1
80 TABLET, FILM COATED ORAL NIGHTLY
Qty: 90 TABLET | Refills: 3 | Status: SHIPPED | OUTPATIENT
Start: 2024-06-13 | End: 2025-06-13

## 2024-06-13 ASSESSMENT — COLUMBIA-SUICIDE SEVERITY RATING SCALE - C-SSRS
2. HAVE YOU ACTUALLY HAD ANY THOUGHTS OF KILLING YOURSELF?: NO
6. HAVE YOU EVER DONE ANYTHING, STARTED TO DO ANYTHING, OR PREPARED TO DO ANYTHING TO END YOUR LIFE?: NO
1. IN THE PAST MONTH, HAVE YOU WISHED YOU WERE DEAD OR WISHED YOU COULD GO TO SLEEP AND NOT WAKE UP?: NO

## 2024-06-13 ASSESSMENT — PATIENT HEALTH QUESTIONNAIRE - PHQ9
2. FEELING DOWN, DEPRESSED OR HOPELESS: NOT AT ALL
SUM OF ALL RESPONSES TO PHQ9 QUESTIONS 1 AND 2: 0
1. LITTLE INTEREST OR PLEASURE IN DOING THINGS: NOT AT ALL

## 2024-06-13 ASSESSMENT — ENCOUNTER SYMPTOMS
DEPRESSION: 0
LOSS OF SENSATION IN FEET: 0
OCCASIONAL FEELINGS OF UNSTEADINESS: 0

## 2024-06-13 NOTE — PROGRESS NOTES
63-year-old female presenting for follow-up on chronic conditions:    HTN  Stable, tolerating current regimen well.     DMII  Stable, tolerating current regimen well.  Following with [facility] pharmacist.     Anxiety  Stable on current regimen.  Needs CSA and UDS updated     Right knee pain  Following with orthopedics    SocHx:   - Smoking: Never    12 point ROS reviewed and negative other than as stated in HPI      General: Alert, oriented, pleasant, in no acute distress  HEENT:      Head: normocephalic, atraumatic;      eyes: EOMI, no scleral icterus;   Neck: soft, supple, non-tender, no masses appreciated  CV: Heart with regular rate and rhythm, normal S1/S2, no murmurs  Lungs: CTAB without wheezing, rhonchi or rales; good respiratory effort, no increased work of breathing  Neuro: Cranial nerves grossly intact; alert and oriented  Psych: Appropriate mood and affect    #HTN  - Near goal in office  -Continue olmesartan 20 mg daily  -Repeat CMP     #DMII  -Last A1c 7.4, 03/2024  -UTD diabetic eye exam  - Diabetic foot exam negative at annual  -Currently on metformin 1000 mg twice daily, Trulicity 4.5 mg weekly, Farxiga 10 mg daily  -Continue to follow with [facility] pharmacist  -Repeat A1c    #HLD  - Continue atorvastatin 40 mg daily  - Repeat lipid panel    #Anxiety  -Continue citalopram 20 mg daily, alprazolam 0.5 mg 3 times daily as needed  - CSA updated today, UDS ordered     #R knee pain  -Plain film with moderate arthritis  - Continue to follow with orthopedics    F/U 6 months, Medicare at that time    Chris D'Amico, DO

## 2024-06-14 ENCOUNTER — APPOINTMENT (OUTPATIENT)
Dept: PHARMACY | Facility: HOSPITAL | Age: 64
End: 2024-06-14
Payer: MEDICARE

## 2024-06-14 ENCOUNTER — LAB (OUTPATIENT)
Dept: LAB | Facility: LAB | Age: 64
End: 2024-06-14
Payer: MEDICARE

## 2024-06-14 DIAGNOSIS — E11.9 TYPE 2 DIABETES MELLITUS WITHOUT COMPLICATION, WITHOUT LONG-TERM CURRENT USE OF INSULIN (MULTI): ICD-10-CM

## 2024-06-14 DIAGNOSIS — E11.9 DIABETES MELLITUS TYPE 2 WITHOUT RETINOPATHY (MULTI): Primary | ICD-10-CM

## 2024-06-14 DIAGNOSIS — I10 ESSENTIAL HYPERTENSION: ICD-10-CM

## 2024-06-14 DIAGNOSIS — E78.5 HYPERLIPIDEMIA, UNSPECIFIED HYPERLIPIDEMIA TYPE: ICD-10-CM

## 2024-06-14 DIAGNOSIS — F41.9 ANXIETY DISORDER, UNSPECIFIED TYPE: ICD-10-CM

## 2024-06-14 DIAGNOSIS — Z79.899 BENZODIAZEPINE CONTRACT EXISTS: ICD-10-CM

## 2024-06-14 LAB
ALBUMIN SERPL BCP-MCNC: 4.3 G/DL (ref 3.4–5)
ALP SERPL-CCNC: 70 U/L (ref 33–136)
ALT SERPL W P-5'-P-CCNC: 30 U/L (ref 7–45)
AMPHETAMINES UR QL SCN: NORMAL
ANION GAP SERPL CALC-SCNC: 16 MMOL/L
AST SERPL W P-5'-P-CCNC: 12 U/L (ref 9–39)
BARBITURATES UR QL SCN: NORMAL
BENZODIAZ UR QL SCN: NORMAL
BILIRUB SERPL-MCNC: 0.5 MG/DL (ref 0–1.2)
BUN SERPL-MCNC: 24 MG/DL (ref 6–23)
BZE UR QL SCN: NORMAL
CALCIUM SERPL-MCNC: 9.7 MG/DL (ref 8.6–10.6)
CANNABINOIDS UR QL SCN: NORMAL
CHLORIDE SERPL-SCNC: 104 MMOL/L (ref 98–107)
CHOLEST SERPL-MCNC: 163 MG/DL (ref 0–199)
CHOLESTEROL/HDL RATIO: 3.1
CO2 SERPL-SCNC: 25 MMOL/L (ref 21–32)
CREAT SERPL-MCNC: 0.66 MG/DL (ref 0.5–1.05)
EGFRCR SERPLBLD CKD-EPI 2021: >90 ML/MIN/1.73M*2
ERYTHROCYTE [DISTWIDTH] IN BLOOD BY AUTOMATED COUNT: 12.9 % (ref 11.5–14.5)
EST. AVERAGE GLUCOSE BLD GHB EST-MCNC: 171 MG/DL
FENTANYL+NORFENTANYL UR QL SCN: NORMAL
GLUCOSE SERPL-MCNC: 152 MG/DL (ref 74–99)
HBA1C MFR BLD: 7.6 %
HCT VFR BLD AUTO: 38.6 % (ref 36–46)
HDLC SERPL-MCNC: 53 MG/DL
HGB BLD-MCNC: 12.5 G/DL (ref 12–16)
LDLC SERPL CALC-MCNC: 83 MG/DL
MCH RBC QN AUTO: 29.1 PG (ref 26–34)
MCHC RBC AUTO-ENTMCNC: 32.4 G/DL (ref 32–36)
MCV RBC AUTO: 90 FL (ref 80–100)
METHADONE UR QL SCN: NORMAL
NON HDL CHOLESTEROL: 110 MG/DL (ref 0–149)
NRBC BLD-RTO: 0 /100 WBCS (ref 0–0)
OPIATES UR QL SCN: NORMAL
OXYCODONE+OXYMORPHONE UR QL SCN: NORMAL
PCP UR QL SCN: NORMAL
PLATELET # BLD AUTO: 274 X10*3/UL (ref 150–450)
POTASSIUM SERPL-SCNC: 5 MMOL/L (ref 3.5–5.3)
PROT SERPL-MCNC: 6.7 G/DL (ref 6.4–8.2)
RBC # BLD AUTO: 4.3 X10*6/UL (ref 4–5.2)
SODIUM SERPL-SCNC: 140 MMOL/L (ref 136–145)
TRIGL SERPL-MCNC: 133 MG/DL (ref 0–149)
VLDL: 27 MG/DL (ref 0–40)
WBC # BLD AUTO: 9.7 X10*3/UL (ref 4.4–11.3)

## 2024-06-14 PROCEDURE — 83036 HEMOGLOBIN GLYCOSYLATED A1C: CPT

## 2024-06-14 PROCEDURE — RXMED WILLOW AMBULATORY MEDICATION CHARGE

## 2024-06-14 PROCEDURE — 80053 COMPREHEN METABOLIC PANEL: CPT

## 2024-06-14 PROCEDURE — 85027 COMPLETE CBC AUTOMATED: CPT

## 2024-06-14 PROCEDURE — 80307 DRUG TEST PRSMV CHEM ANLYZR: CPT

## 2024-06-14 PROCEDURE — 36415 COLL VENOUS BLD VENIPUNCTURE: CPT

## 2024-06-14 PROCEDURE — 80061 LIPID PANEL: CPT

## 2024-06-14 NOTE — LETTER
June 18, 2024     Keith Fregoso  [address] [place] [address]      Dear Ms. Fregoso:    Below are the results from your recent visit:    Hemoglobin A1c is at 7.6, still a little bit above diabetic goal, and slightly worsened over the past couple months.  Continue to work on diet, particularly reduction in carbohydrates such as bread, pasta,, sweets     LDL at 83, would consider increasing atorvastatin to 80 mg daily, or we can continue to work on the dietary changes.     Remaining labs unremarkable.         If you have any questions or concerns, please don't hesitate to call.

## 2024-06-14 NOTE — PROGRESS NOTES
Pharmacist Clinic: Diabetes Management  Keith Fregoso is a 63 y.o. female was referred to Clinical Pharmacy Team for diabetes management.     Referring Provider: Christopher D'Amico, DO     HISTORY OF PRESENT ILLNESS  Approximate Date of Diagnosis: approx 20 years ago  Diet: patient states she knows she does not eat healthy, she needs to work on it   Exercise Routine: minimal given her leg problems     LAB REVIEW   Glucose (mg/dL)   Date Value   03/22/2024 139 (H)   12/01/2023 124 (H)   08/11/2023 146 (H)   05/12/2023 175 (H)   02/08/2023 150 (H)     Hemoglobin A1C (%)   Date Value   03/22/2024 7.4 (H)   12/01/2023 8.3 (H)   08/11/2023 7.9 (A)   05/12/2023 8.3 (A)   02/08/2023 8.2 (A)     Bicarbonate (mmol/L)   Date Value   03/22/2024 25   12/01/2023 28   08/11/2023 28   05/12/2023 25   02/08/2023 31     Urea Nitrogen (mg/dL)   Date Value   03/22/2024 23   12/01/2023 18   08/11/2023 20   05/12/2023 18   02/08/2023 18     Creatinine (mg/dL)   Date Value   03/22/2024 0.69   12/01/2023 0.68   08/11/2023 0.62   05/12/2023 0.57   02/08/2023 0.59     Lab Results   Component Value Date    CHOL 168 03/22/2024    CHOL 153 12/01/2023    CHOL 144 08/11/2023     Lab Results   Component Value Date    HDL 48.4 03/22/2024    HDL 45.5 12/01/2023    HDL 38.9 (A) 08/11/2023     Lab Results   Component Value Date    LDLCALC 96 03/22/2024    LDLCALC 84 12/01/2023     Lab Results   Component Value Date    TRIG 116 03/22/2024    TRIG 120 12/01/2023    TRIG 179 (H) 08/11/2023     DIABETES ASSESSMENT    CURRENT PHARMACOTHERAPY  - metformin 500 mg twice daily   - trulicity 4.5 mg under the skin once weekly  - farxiga 10 mg once daily    SECONDARY PREVENTION  - Statin? Yes  - ACE-I/ARB? Yes, patient appears to be non-adherent     SMBG MEASUREMENTS: AM readings around 140-150     DISCUSSION:  Last A1C came back at 7.4%, your next A1c is still resulting   Patient is tolerating trulicity (4.5 mg), she denies any side effects  Patient is  tolerating farxiga without issues, she denies any side effects or  infections     RECOMMENDATIONS/PLAN  1. Patients diabetes is improving with most recent A1c of 7.4 % (goal < 7 %).   - Continue all meds under the continuation of care with the referring provider and clinical pharmacy team.    Clinical Pharmacist follow up: 3 months   PAP Approval: 9/5/23    Thank you,  Alejandra Espinal, PharmD    Verbal consent to manage patient's drug therapy was obtained from the patient. They were informed they may decline to participate or withdraw from participation in pharmacy services at any time.

## 2024-06-15 ENCOUNTER — PHARMACY VISIT (OUTPATIENT)
Dept: PHARMACY | Facility: CLINIC | Age: 64
End: 2024-06-15
Payer: MEDICARE

## 2024-06-15 NOTE — RESULT ENCOUNTER NOTE
Hemoglobin A1c is at 7.6, still a little bit above diabetic goal, and slightly worsened over the past couple months.  Continue to work on diet, particularly reduction in carbohydrates such as bread, pasta,, sweets    LDL at 83, would consider increasing atorvastatin to 80 mg daily, or we can continue to work on the dietary changes.    Remaining labs unremarkable.

## 2024-06-17 DIAGNOSIS — K21.9 GASTROESOPHAGEAL REFLUX DISEASE, UNSPECIFIED WHETHER ESOPHAGITIS PRESENT: ICD-10-CM

## 2024-06-17 RX ORDER — OLMESARTAN MEDOXOMIL 20 MG/1
20 TABLET ORAL DAILY
Qty: 90 TABLET | Refills: 3 | Status: SHIPPED | OUTPATIENT
Start: 2024-06-17

## 2024-06-18 ENCOUNTER — APPOINTMENT (OUTPATIENT)
Dept: ORTHOPEDIC SURGERY | Facility: CLINIC | Age: 64
End: 2024-06-18
Payer: MEDICARE

## 2024-06-18 ENCOUNTER — TELEPHONE (OUTPATIENT)
Dept: PRIMARY CARE | Facility: CLINIC | Age: 64
End: 2024-06-18

## 2024-06-18 NOTE — TELEPHONE ENCOUNTER
Result Communication    Resulted Orders   CBC   Result Value Ref Range    WBC 9.7 4.4 - 11.3 x10*3/uL    nRBC 0.0 0.0 - 0.0 /100 WBCs    RBC 4.30 4.00 - 5.20 x10*6/uL    Hemoglobin 12.5 12.0 - 16.0 g/dL    Hematocrit 38.6 36.0 - 46.0 %    MCV 90 80 - 100 fL    MCH 29.1 26.0 - 34.0 pg    MCHC 32.4 32.0 - 36.0 g/dL    RDW 12.9 11.5 - 14.5 %    Platelets 274 150 - 450 x10*3/uL   Comprehensive Metabolic Panel   Result Value Ref Range    Glucose 152 (H) 74 - 99 mg/dL    Sodium 140 136 - 145 mmol/L    Potassium 5.0 3.5 - 5.3 mmol/L    Chloride 104 98 - 107 mmol/L    Bicarbonate 25 21 - 32 mmol/L    Anion Gap 16 mmol/L    Urea Nitrogen 24 (H) 6 - 23 mg/dL    Creatinine 0.66 0.50 - 1.05 mg/dL    eGFR >90 >60 mL/min/1.73m*2      Comment:      Calculations of estimated GFR are performed using the 2021 CKD-EPI Study Refit equation without the race variable for the IDMS-Traceable creatinine methods.  https://jasn.asnjournals.org/content/early/2021/09/22/ASN.9263405609    Calcium 9.7 8.6 - 10.6 mg/dL    Albumin 4.3 3.4 - 5.0 g/dL    Alkaline Phosphatase 70 33 - 136 U/L    Total Protein 6.7 6.4 - 8.2 g/dL    AST 12 9 - 39 U/L    Bilirubin, Total 0.5 0.0 - 1.2 mg/dL    ALT 30 7 - 45 U/L      Comment:      Patients treated with Sulfasalazine may generate falsely decreased results for ALT.   Lipid Panel   Result Value Ref Range    Cholesterol 163 0 - 199 mg/dL      Comment:            Age      Desirable   Borderline High   High     0-19 Y     0 - 169       170 - 199     >/= 200    20-24 Y     0 - 189       190 - 224     >/= 225         >24 Y     0 - 199       200 - 239     >/= 240   **All ranges are based on fasting samples. Specific   therapeutic targets will vary based on patient-specific   cardiac risk.    Pediatric guidelines reference:Pediatrics 2011, 128(S5).Adult guidelines reference: NCEP ATPIII Guidelines,GEN 2001, 258:2486-97    Venipuncture immediately after or during the administration of Metamizole may lead to falsely  low results. Testing should be performed immediately prior to Metamizole dosing.    HDL-Cholesterol 53.0 mg/dL    Cholesterol/HDL Ratio 3.1       Comment:        Ref Values  Desirable  < 3.4  High Risk  > 5.0    LDL Calculated 83 mg/dL      Comment:                                  Near   Borderline      AGE      Desirable  Optimal    High     High     Very High     0-19 Y     0 - 109     ---    110-129   >/= 130     ----    20-24 Y     0 - 119     ---    120-159   >/= 160     ----      >24 Y     0 -  99   100-129  130-159   160-189     >/=190      VLDL 27 0 - 40 mg/dL    Triglycerides 133 0 - 149 mg/dL    Non HDL Cholesterol 110 0 - 149 mg/dL      Comment:            Age       Desirable   Borderline High   High     Very High     0-19 Y     0 - 119       120 - 144     >/= 145    >/= 160    20-24 Y     0 - 149       150 - 189     >/= 190      ----         >24 Y    30 mg/dL above LDL Cholesterol goal     Hemoglobin A1C   Result Value Ref Range    Hemoglobin A1C 7.6 (H) see below %    Estimated Average Glucose 171 Not Established mg/dL    Narrative    Diagnosis of Diabetes-Adults  Non-Diabetic: < or = 5.6%  Increased risk for developing diabetes: 5.7-6.4%  Diagnostic of diabetes: > or = 6.5%    Monitoring of Diabetes  Age (y)....................... Therapeutic Goal (%)  Adults: >18.........................<7.0  Pediatrics: 13-18...................<7.5  Pediatrics: 7-12....................<8.0  Pediatrics: 0-6..................... 7.5-8.5    American Diabetes Association. Diabetes Care 33(S1), Jan 2010       Drug Screen, Urine With Reflex to Confirmation   Result Value Ref Range    Amphetamine Screen, Urine Presumptive Negative Presumptive Negative      Comment:      CUTOFF LEVEL: 500 NG/ML   Cross-reactivity has been reported with high concentrations   of the following drugs: buproprion, chloroquine, chlorpromazine,   ephedrine, mephentermine, fenfluramine, phentermine,   phenylpropanolamine, pseudoephedrine, and  propranolol.    Barbiturate Screen, Urine Presumptive Negative Presumptive Negative      Comment:      CUTOFF LEVEL: 200 NG/ML    Benzodiazepines Screen, Urine Presumptive Negative Presumptive Negative      Comment:      CUTOFF LEVEL: 200 NG/ML    Cannabinoid Screen, Urine Presumptive Negative Presumptive Negative      Comment:      CUTOFF LEVEL: 50 NG/ML    Cocaine Metabolite Screen, Urine Presumptive Negative Presumptive Negative      Comment:      CUTOFF LEVEL: 150 NG/ML    Fentanyl Screen, Urine Presumptive Negative Presumptive Negative      Comment:      CUTOFF LEVEL: 5 NG/ML    Opiate Screen, Urine Presumptive Negative Presumptive Negative      Comment:      CUTOFF LEVEL: 300 NG/ML  The opiate screen does not detect fentanyl, meperidine, or   tramadol. Oxycodone is not consistently detected (refer to  Oxycodone Screen, Urine result).    Oxycodone Screen, Urine Presumptive Negative Presumptive Negative      Comment:      CUTOFF LEVEL: 100 NG/ML  This test will accurately detect both oxycodone and oxymorphone.    PCP Screen, Urine Presumptive Negative Presumptive Negative      Comment:      CUTOFF LEVEL:  25 NG/ML  Cross-reactivity has been reported with dextromethorphan.    Methadone Screen, Urine Presumptive Negative Presumptive Negative      Comment:      CUTOFF LEVEL: 150 NG/ML  The metabolite L-alpha-acetylmethadol (LAAM) is not  detected by this method in concentrations that would  be found in the urine of patients on LAAM therapy.    Narrative    Drug screen results are presumptive and should not be used to assess   compliance with prescribed medication. Definitive confirmatory drug testing   has been added to this sample for any positive screen result and will be  reported separately.     Toxicology screening results are reported qualitatively. The concentration must  be greater than or equal to the cutoff to be reported as positive. The concentration   at which the screening test can detect an  individual drug or metabolite varies.   The absence of expected drug(s) and/or drug metabolite(s) may indicate non-compliance,   inappropriate timing of specimen collection relative to drug administration, poor drug   absorption, diluted/adulterated urine, or limitations of testing. For medical purposes   only; not valid for forensic use.    Interpretive questions should be directed to the laboratory medical directors.       2:31 PM      Results were not successfully communicated with the patient and they did not acknowledge their understanding.    Sent letter to her and her

## 2024-06-18 NOTE — TELEPHONE ENCOUNTER
----- Message from Christopher D'Amico, DO sent at 6/15/2024  4:31 PM EDT -----  Hemoglobin A1c is at 7.6, still a little bit above diabetic goal, and slightly worsened over the past couple months.  Continue to work on diet, particularly reduction in carbohydrates such as bread, pasta,, sweets    LDL at 83, would consider increasing atorvastatin to 80 mg daily, or we can continue to work on the dietary changes.    Remaining labs unremarkable.

## 2024-07-24 DIAGNOSIS — E11.9 DIABETES MELLITUS TYPE 2 WITHOUT RETINOPATHY (MULTI): ICD-10-CM

## 2024-07-24 PROCEDURE — RXMED WILLOW AMBULATORY MEDICATION CHARGE

## 2024-07-24 RX ORDER — DAPAGLIFLOZIN 10 MG/1
10 TABLET, FILM COATED ORAL DAILY
Qty: 90 TABLET | Refills: 0 | Status: SHIPPED | OUTPATIENT
Start: 2024-07-24

## 2024-07-26 ENCOUNTER — PHARMACY VISIT (OUTPATIENT)
Dept: PHARMACY | Facility: CLINIC | Age: 64
End: 2024-07-26
Payer: MEDICARE

## 2024-07-30 DIAGNOSIS — R42 VERTIGO: ICD-10-CM

## 2024-07-30 RX ORDER — MECLIZINE HYDROCHLORIDE 25 MG/1
TABLET ORAL
Qty: 180 TABLET | Refills: 2 | Status: SHIPPED | OUTPATIENT
Start: 2024-07-30

## 2024-08-14 ENCOUNTER — APPOINTMENT (OUTPATIENT)
Dept: PHARMACY | Facility: HOSPITAL | Age: 64
End: 2024-08-14
Payer: MEDICARE

## 2024-08-14 DIAGNOSIS — E11.9 DIABETES MELLITUS TYPE 2 WITHOUT RETINOPATHY (MULTI): ICD-10-CM

## 2024-08-14 RX ORDER — DAPAGLIFLOZIN 10 MG/1
10 TABLET, FILM COATED ORAL DAILY
Qty: 90 TABLET | Refills: 1 | Status: SHIPPED | OUTPATIENT
Start: 2024-08-14

## 2024-08-14 RX ORDER — DULAGLUTIDE 4.5 MG/.5ML
4.5 INJECTION, SOLUTION SUBCUTANEOUS
Qty: 6 ML | Refills: 1 | Status: SHIPPED | OUTPATIENT
Start: 2024-08-18

## 2024-08-14 NOTE — PROGRESS NOTES
Pharmacist Clinic: Diabetes Management  Keith Fregoso is a 63 y.o. female was referred to Clinical Pharmacy Team for diabetes management.     Referring Provider: Christopher D'Amico, DO     HISTORY OF PRESENT ILLNESS  Approximate Date of Diagnosis: approx 20 years ago  Diet: patient states she knows she does not eat healthy, she needs to work on it   Exercise Routine: minimal given her leg problems     LAB REVIEW   Glucose (mg/dL)   Date Value   06/14/2024 152 (H)   03/22/2024 139 (H)   12/01/2023 124 (H)     Hemoglobin A1C (%)   Date Value   06/14/2024 7.6 (H)   03/22/2024 7.4 (H)   12/01/2023 8.3 (H)     Bicarbonate (mmol/L)   Date Value   06/14/2024 25   03/22/2024 25   12/01/2023 28     Urea Nitrogen (mg/dL)   Date Value   06/14/2024 24 (H)   03/22/2024 23   12/01/2023 18     Creatinine (mg/dL)   Date Value   06/14/2024 0.66   03/22/2024 0.69   12/01/2023 0.68     Lab Results   Component Value Date    CHOL 163 06/14/2024    CHOL 168 03/22/2024    CHOL 153 12/01/2023     Lab Results   Component Value Date    HDL 53.0 06/14/2024    HDL 48.4 03/22/2024    HDL 45.5 12/01/2023     Lab Results   Component Value Date    LDLCALC 83 06/14/2024    LDLCALC 96 03/22/2024    LDLCALC 84 12/01/2023     Lab Results   Component Value Date    TRIG 133 06/14/2024    TRIG 116 03/22/2024    TRIG 120 12/01/2023     DIABETES ASSESSMENT    CURRENT PHARMACOTHERAPY  - metformin 500 mg twice daily   - trulicity 4.5 mg under the skin once weekly  - farxiga 10 mg once daily    SECONDARY PREVENTION  - Statin? Yes  - ACE-I/ARB? Yes, patient appears to be non-adherent     SMBG MEASUREMENTS: AM readings around 120    DISCUSSION:  Last A1C came back at 7.6%  Patient is tolerating trulicity (4.5 mg), she denies any side effects  Patient is tolerating farxiga without issues, she denies any side effects or  infections     RECOMMENDATIONS/PLAN  1. Patients diabetes is improving with most recent A1c of 7.4 % (goal < 7 %).   - Continue all meds  under the continuation of care with the referring provider and clinical pharmacy team.  - Bring financial info into the office for  PAP re-enrollment.     Clinical Pharmacist follow up: 1 week    PAP Approval: 9/5/23    Thank you,  Alejandra Espinal, PharmD    Verbal consent to manage patient's drug therapy was obtained from the patient. They were informed they may decline to participate or withdraw from participation in pharmacy services at any time.

## 2024-08-15 DIAGNOSIS — E55.9 VITAMIN D DEFICIENCY: ICD-10-CM

## 2024-08-15 RX ORDER — ERGOCALCIFEROL 1.25 MG/1
1 CAPSULE ORAL
Qty: 12 CAPSULE | Refills: 3 | Status: SHIPPED | OUTPATIENT
Start: 2024-08-18

## 2024-08-21 ENCOUNTER — APPOINTMENT (OUTPATIENT)
Dept: PHARMACY | Facility: HOSPITAL | Age: 64
End: 2024-08-21
Payer: MEDICARE

## 2024-08-21 DIAGNOSIS — E11.9 DIABETES MELLITUS TYPE 2 WITHOUT RETINOPATHY (MULTI): ICD-10-CM

## 2024-08-21 RX ORDER — DULAGLUTIDE 4.5 MG/.5ML
4.5 INJECTION, SOLUTION SUBCUTANEOUS
Qty: 6 ML | Refills: 1 | Status: SHIPPED | OUTPATIENT
Start: 2024-08-25

## 2024-08-21 RX ORDER — DAPAGLIFLOZIN 10 MG/1
10 TABLET, FILM COATED ORAL DAILY
Qty: 90 TABLET | Refills: 1 | Status: SHIPPED | OUTPATIENT
Start: 2024-08-21

## 2024-08-21 NOTE — PROGRESS NOTES
Pharmacist Clinic: Diabetes Management  Keith Fregoso is a 63 y.o. female was referred to Clinical Pharmacy Team for diabetes management.     Referring Provider: Christopher D'Amico, DO     HISTORY OF PRESENT ILLNESS  Approximate Date of Diagnosis: approx 20 years ago  Comborbidities: obesity, CKD  Diet: patient states she knows she does not eat healthy, she needs to work on it   Exercise Routine: minimal given her leg problems     LAB REVIEW   Glucose (mg/dL)   Date Value   06/14/2024 152 (H)   03/22/2024 139 (H)   12/01/2023 124 (H)     Hemoglobin A1C (%)   Date Value   06/14/2024 7.6 (H)   03/22/2024 7.4 (H)   12/01/2023 8.3 (H)     Bicarbonate (mmol/L)   Date Value   06/14/2024 25   03/22/2024 25   12/01/2023 28     Urea Nitrogen (mg/dL)   Date Value   06/14/2024 24 (H)   03/22/2024 23   12/01/2023 18     Creatinine (mg/dL)   Date Value   06/14/2024 0.66   03/22/2024 0.69   12/01/2023 0.68     Lab Results   Component Value Date    CHOL 163 06/14/2024    CHOL 168 03/22/2024    CHOL 153 12/01/2023     Lab Results   Component Value Date    HDL 53.0 06/14/2024    HDL 48.4 03/22/2024    HDL 45.5 12/01/2023     Lab Results   Component Value Date    LDLCALC 83 06/14/2024    LDLCALC 96 03/22/2024    LDLCALC 84 12/01/2023     Lab Results   Component Value Date    TRIG 133 06/14/2024    TRIG 116 03/22/2024    TRIG 120 12/01/2023     DIABETES ASSESSMENT    CURRENT PHARMACOTHERAPY  - metformin 500 mg twice daily   - trulicity 4.5 mg under the skin once weekly  - farxiga 10 mg once daily    SECONDARY PREVENTION  - Statin? Yes  - ACE-I/ARB? Yes, patient appears to be non-adherent     SMBG MEASUREMENTS: AM readings around 120    DISCUSSION:  Last A1C came back at 7.6%  Patient is tolerating trulicity (4.5 mg), she denies any side effects  Patient is tolerating farxiga without issues, she denies any side effects or  infections     RECOMMENDATIONS/PLAN  1. Patients diabetes is improving with most recent A1c of 7.6 % (goal <  7 %).   - Continue all meds under the continuation of care with the referring provider and clinical pharmacy team.  - If next A1c remains elevated, discuss adjusting GLP1 to mounjaro.     Clinical Pharmacist follow up: 3 months    PAP Approval: pending     Thank you,  Alejandra Espinal, PharmD    Verbal consent to manage patient's drug therapy was obtained from the patient. They were informed they may decline to participate or withdraw from participation in pharmacy services at any time.

## 2024-08-22 PROCEDURE — RXMED WILLOW AMBULATORY MEDICATION CHARGE

## 2024-08-23 ENCOUNTER — PHARMACY VISIT (OUTPATIENT)
Dept: PHARMACY | Facility: CLINIC | Age: 64
End: 2024-08-23
Payer: MEDICARE

## 2024-09-18 DIAGNOSIS — K21.9 GASTROESOPHAGEAL REFLUX DISEASE, UNSPECIFIED WHETHER ESOPHAGITIS PRESENT: ICD-10-CM

## 2024-09-18 DIAGNOSIS — F41.9 ANXIETY: ICD-10-CM

## 2024-09-18 RX ORDER — OLMESARTAN MEDOXOMIL 20 MG/1
20 TABLET ORAL DAILY
Qty: 90 TABLET | Refills: 3 | Status: SHIPPED | OUTPATIENT
Start: 2024-09-18

## 2024-09-18 RX ORDER — ALPRAZOLAM 0.5 MG/1
0.5 TABLET ORAL 3 TIMES DAILY PRN
Qty: 90 TABLET | Refills: 0 | Status: SHIPPED | OUTPATIENT
Start: 2024-09-18

## 2024-09-30 PROCEDURE — RXMED WILLOW AMBULATORY MEDICATION CHARGE

## 2024-10-01 ENCOUNTER — PHARMACY VISIT (OUTPATIENT)
Dept: PHARMACY | Facility: CLINIC | Age: 64
End: 2024-10-01
Payer: MEDICARE

## 2024-10-09 ENCOUNTER — HOSPITAL ENCOUNTER (OUTPATIENT)
Dept: RADIOLOGY | Facility: EXTERNAL LOCATION | Age: 64
Discharge: HOME | End: 2024-10-09

## 2024-10-09 ENCOUNTER — APPOINTMENT (OUTPATIENT)
Dept: ORTHOPEDIC SURGERY | Facility: CLINIC | Age: 64
End: 2024-10-09
Payer: MEDICARE

## 2024-10-09 DIAGNOSIS — M17.11 LOCALIZED OSTEOARTHRITIS OF RIGHT KNEE: ICD-10-CM

## 2024-10-09 PROCEDURE — 20611 DRAIN/INJ JOINT/BURSA W/US: CPT | Performed by: FAMILY MEDICINE

## 2024-10-09 PROCEDURE — 3051F HG A1C>EQUAL 7.0%<8.0%: CPT | Performed by: FAMILY MEDICINE

## 2024-10-09 PROCEDURE — 1159F MED LIST DOCD IN RCRD: CPT | Performed by: FAMILY MEDICINE

## 2024-10-09 PROCEDURE — 99213 OFFICE O/P EST LOW 20 MIN: CPT | Performed by: FAMILY MEDICINE

## 2024-10-09 PROCEDURE — 4010F ACE/ARB THERAPY RXD/TAKEN: CPT | Performed by: FAMILY MEDICINE

## 2024-10-09 PROCEDURE — 3048F LDL-C <100 MG/DL: CPT | Performed by: FAMILY MEDICINE

## 2024-10-09 PROCEDURE — 1036F TOBACCO NON-USER: CPT | Performed by: FAMILY MEDICINE

## 2024-10-09 RX ORDER — LIDOCAINE HYDROCHLORIDE 10 MG/ML
2 INJECTION, SOLUTION INFILTRATION; PERINEURAL
Status: COMPLETED | OUTPATIENT
Start: 2024-10-09 | End: 2024-10-09

## 2024-10-09 NOTE — PROGRESS NOTES
** Please excuse any errors in grammar or translation related to this dictation. Voice recognition software was utilized to prepare this document. **    Assessment & Plan:  Patient here for ongoing management of advanced right knee arthritis.   We again reviewed treatment options to include:  - Maintaining a healthy weight: Every pound of bodyweight is about 4-5 pounds through the lower extremity.   - Exercise program to strengthen supportive musculature.    - Activity modifications as needed to include use of cane/walker or braces. Encouraged her to use cane for support.  - Prescription and otc analgesics to include but not limited to APAP, ibuprofen, naproxen, diclofenac gel. Continue meloxicam and capsaicin cream to mitigate pain, if needed.  - Steroid injection. Good response to CSI in June. Can repeat at her discretion.   - Hyaluronic acid injection. Durolane injection  completed today for patient's knee arthritis. Discussed with patient that it can take 2-3 weeks for pain relief to occur. Informed patient they may have slight increased discomfort over the next week following injection.  During this time, can continue normal pain relieving strategies (ice, heat, analgesics, etc.) as needed. This injection can be repeated again in 6 months if providing symptomatic relief.   - After failing non-operative measures, may ultimately require arthroplasty. Patient defers referral at this time.     All questions answered and patient is agreeable to this plan.       Chief complaint:  Right knee pain    HPI:  10/9/24: Patient presents for ongoing management of right knee pain secondary to arthritis.  Patient reports the cortisone injection on 6/12 provided for with good relief for around 2 months.  Part of this time was during a trip to [facility] where she was doing a lot of walking and felt pain was well-controlled.    6/12/24: Patient following up today for right knee pain secondary to advanced arthritis.  Unfortunately  steroid injection completed in March only helped for about 2 weeks.  Pain has not been well-controlled since.  Does use Advil occasionally.    3/20/24: 64y/o patient, hx of T2DM and HLD,  presents with right knee pain.  This complaint has been ongoing for a year.  No mechanism of injury reported at onset. Symptoms have progressively worsened with time.  Pain is most prominent at medial knee. Feels there is always a bit of swelling.  It is associated with sensation of stiffness, loss of joint motion, crackling/popping sensation.  No reported sensory changes or weakness, joint locking, or feeling of instability.  Symptoms are aggravated by activity including walking and bending. To date, patient has tried a variety of treatments to include multiple topical creams, ibuprofen, knee brace with little sustained effect. She has never had any injections in the knee.  Denies previous surgery to this site.   Patient previously saw her PCP for this and was referred to PT and Ortho for further eval and management.  Did not complete PT.       Exam:  RIGHT Knee examined. No effusion, no ecchymosis, no erythema.  AROM from 5 to 110 deg with 5/5 strength. SILT overlying knee. Motion crepitus present. Tenderness along medial joint line.      General Exam:  Constitutional - NAD, AAO x 3, conversing appropriately.  HEENT- Normocephalic and atraumatic. EOMI, PERRLA, No scleral icterus. No facial deformities. Hearing grossly normal.  Lungs - Breathing non-labored with normal rate. No accessory muscle use.  CV - Extremities warm and well-perfused, brisk capillary refill present.   Neuro - CN II-XII grossly intact.    Results:  X-rays of right knee obtained May 2023: advanced degenerative changes of the medial and patellofemoral compartments with mild to moderate changes of the lateral apartment.    Component      Latest Ref Rng 3/22/2024   Hemoglobin A1C      see below % 7.4 (H)    Estimated Average Glucose      Not Established mg/dL 166       Patient ID: Keith Fregoso is a 63 y.o. female.    L Inj/Asp: R knee on 10/9/2024 1:33 PM  Indications: pain  Details: 22 G needle, ultrasound-guided superolateral approach  Medications: 60 mg sodium hyaluronate 60 mg/3 mL; 2 mL lidocaine 10 mg/mL (1 %)  Outcome: tolerated well, no immediate complications    Procedure risk factors to include increased pain, bleeding, infection, neurovascular injury, soft tissue injury,  and adverse reaction to medication were discussed with the patient. Patient understands there is a moderate risk of morbidity from undergoing the procedure.    Procedure, treatment alternatives, risks and benefits explained, specific risks discussed. Consent was given by the patient. Immediately prior to procedure a time out was called to verify the correct patient, procedure, equipment, support staff and site/side marked as required. Patient was prepped and draped in the usual sterile fashion.

## 2024-10-29 DIAGNOSIS — E11.69 TYPE 2 DIABETES MELLITUS WITH OTHER SPECIFIED COMPLICATION, UNSPECIFIED WHETHER LONG TERM INSULIN USE (MULTI): ICD-10-CM

## 2024-10-29 RX ORDER — METFORMIN HYDROCHLORIDE 500 MG/1
500 TABLET ORAL 2 TIMES DAILY
Qty: 180 TABLET | Refills: 1 | Status: SHIPPED | OUTPATIENT
Start: 2024-10-29

## 2024-11-21 ENCOUNTER — APPOINTMENT (OUTPATIENT)
Dept: PHARMACY | Facility: HOSPITAL | Age: 64
End: 2024-11-21
Payer: MEDICARE

## 2024-11-21 DIAGNOSIS — E11.9 DIABETES MELLITUS TYPE 2 WITHOUT RETINOPATHY (MULTI): Primary | ICD-10-CM

## 2024-11-21 PROCEDURE — RXMED WILLOW AMBULATORY MEDICATION CHARGE

## 2024-11-21 RX ORDER — TIRZEPATIDE 7.5 MG/.5ML
7.5 INJECTION, SOLUTION SUBCUTANEOUS WEEKLY
Qty: 2 ML | Refills: 5 | Status: SHIPPED | OUTPATIENT
Start: 2024-11-21

## 2024-11-21 NOTE — PROGRESS NOTES
Pharmacist Clinic: Diabetes Management  Keith Fregoso is a 64 y.o. female was referred to Clinical Pharmacy Team for diabetes management.     Referring Provider: Christopher D'Amico, DO     HISTORY OF PRESENT ILLNESS  Approximate Date of Diagnosis: approx 20 years ago  Comborbidities: obesity, CKD  Diet: patient states she knows she does not eat healthy, she needs to work on it   Exercise Routine: minimal given her leg problems     LAB REVIEW   Glucose (mg/dL)   Date Value   06/14/2024 152 (H)   03/22/2024 139 (H)   12/01/2023 124 (H)     Hemoglobin A1C (%)   Date Value   06/14/2024 7.6 (H)   03/22/2024 7.4 (H)   12/01/2023 8.3 (H)     Bicarbonate (mmol/L)   Date Value   06/14/2024 25   03/22/2024 25   12/01/2023 28     Urea Nitrogen (mg/dL)   Date Value   06/14/2024 24 (H)   03/22/2024 23   12/01/2023 18     Creatinine (mg/dL)   Date Value   06/14/2024 0.66   03/22/2024 0.69   12/01/2023 0.68     Lab Results   Component Value Date    CHOL 163 06/14/2024    CHOL 168 03/22/2024    CHOL 153 12/01/2023     Lab Results   Component Value Date    HDL 53.0 06/14/2024    HDL 48.4 03/22/2024    HDL 45.5 12/01/2023     Lab Results   Component Value Date    LDLCALC 83 06/14/2024    LDLCALC 96 03/22/2024    LDLCALC 84 12/01/2023     Lab Results   Component Value Date    TRIG 133 06/14/2024    TRIG 116 03/22/2024    TRIG 120 12/01/2023     DIABETES ASSESSMENT    CURRENT PHARMACOTHERAPY  - metformin 500 mg twice daily   - trulicity 4.5 mg under the skin once weekly  - farxiga 10 mg once daily    SECONDARY PREVENTION  - Statin? Yes  - ACE-I/ARB? Yes, patient appears to be non-adherent     SMBG MEASUREMENTS: 160-170     HISTORICAL PHARMACOTHERAPY   - jardiance: vaginal itchiness  - farxiga: vaginal itchiness     DISCUSSION:  Last A1C came back at 7.6%  Given patient has stopped farixga due to  symptoms (no infection), we will need to make some medication adjustments   Counseled on switching to mounjaro 7.5 mg once weekly   Went  over MOA, expectations, side effects, differences from trulicity   Answered all patient questions and concerns    RECOMMENDATIONS/PLAN  1. Patients diabetes is improving with most recent A1c of 7.6 % (goal < 7 %).   - Continue all meds under the continuation of care with the referring provider and clinical pharmacy team.  - Discontinue farxiga 10 mg once daily.   - Discontinue trulicity 4.5 mg once weekly.   - Initiate mounjaro 7.5 mg once weekly.     Clinical Pharmacist follow up: 2 weeks   PAP Approval: 8/22/2024    Thank you,  Alejandra Espinal, PharmD    Verbal consent to manage patient's drug therapy was obtained from the patient. They were informed they may decline to participate or withdraw from participation in pharmacy services at any time.

## 2024-11-22 ENCOUNTER — PHARMACY VISIT (OUTPATIENT)
Dept: PHARMACY | Facility: CLINIC | Age: 64
End: 2024-11-22
Payer: MEDICARE

## 2024-12-05 ENCOUNTER — APPOINTMENT (OUTPATIENT)
Dept: PHARMACY | Facility: HOSPITAL | Age: 64
End: 2024-12-05
Payer: MEDICARE

## 2024-12-11 ENCOUNTER — APPOINTMENT (OUTPATIENT)
Dept: PRIMARY CARE | Facility: CLINIC | Age: 64
End: 2024-12-11
Payer: MEDICARE

## 2024-12-11 VITALS
DIASTOLIC BLOOD PRESSURE: 57 MMHG | BODY MASS INDEX: 34.36 KG/M2 | HEIGHT: 60 IN | OXYGEN SATURATION: 97 % | WEIGHT: 175 LBS | SYSTOLIC BLOOD PRESSURE: 119 MMHG | HEART RATE: 79 BPM

## 2024-12-11 DIAGNOSIS — E11.9 TYPE 2 DIABETES MELLITUS WITHOUT COMPLICATION, WITHOUT LONG-TERM CURRENT USE OF INSULIN (MULTI): Primary | ICD-10-CM

## 2024-12-11 DIAGNOSIS — E78.5 HYPERLIPIDEMIA, UNSPECIFIED HYPERLIPIDEMIA TYPE: ICD-10-CM

## 2024-12-11 DIAGNOSIS — Z78.0 ASYMPTOMATIC MENOPAUSAL STATE: ICD-10-CM

## 2024-12-11 DIAGNOSIS — F41.9 ANXIETY DISORDER, UNSPECIFIED TYPE: ICD-10-CM

## 2024-12-11 DIAGNOSIS — E78.00 HYPERCHOLESTEROLEMIA: ICD-10-CM

## 2024-12-11 DIAGNOSIS — Z00.00 MEDICARE ANNUAL WELLNESS VISIT, SUBSEQUENT: ICD-10-CM

## 2024-12-11 DIAGNOSIS — Z12.31 ENCOUNTER FOR SCREENING MAMMOGRAM FOR MALIGNANT NEOPLASM OF BREAST: ICD-10-CM

## 2024-12-11 DIAGNOSIS — Z12.11 SCREENING FOR COLON CANCER: ICD-10-CM

## 2024-12-11 DIAGNOSIS — I15.9 SECONDARY HYPERTENSION: ICD-10-CM

## 2024-12-11 DIAGNOSIS — Z79.899 BENZODIAZEPINE CONTRACT EXISTS: ICD-10-CM

## 2024-12-11 DIAGNOSIS — I10 ESSENTIAL HYPERTENSION: ICD-10-CM

## 2024-12-11 DIAGNOSIS — Z00.00 WELLNESS EXAMINATION: ICD-10-CM

## 2024-12-11 DIAGNOSIS — E55.9 VITAMIN D DEFICIENCY: ICD-10-CM

## 2024-12-11 DIAGNOSIS — F41.9 ANXIETY: ICD-10-CM

## 2024-12-11 PROCEDURE — 99214 OFFICE O/P EST MOD 30 MIN: CPT | Performed by: STUDENT IN AN ORGANIZED HEALTH CARE EDUCATION/TRAINING PROGRAM

## 2024-12-11 PROCEDURE — 3051F HG A1C>EQUAL 7.0%<8.0%: CPT | Performed by: STUDENT IN AN ORGANIZED HEALTH CARE EDUCATION/TRAINING PROGRAM

## 2024-12-11 PROCEDURE — G0008 ADMIN INFLUENZA VIRUS VAC: HCPCS | Performed by: STUDENT IN AN ORGANIZED HEALTH CARE EDUCATION/TRAINING PROGRAM

## 2024-12-11 PROCEDURE — G0439 PPPS, SUBSEQ VISIT: HCPCS | Performed by: STUDENT IN AN ORGANIZED HEALTH CARE EDUCATION/TRAINING PROGRAM

## 2024-12-11 PROCEDURE — 1170F FXNL STATUS ASSESSED: CPT | Performed by: STUDENT IN AN ORGANIZED HEALTH CARE EDUCATION/TRAINING PROGRAM

## 2024-12-11 PROCEDURE — 99397 PER PM REEVAL EST PAT 65+ YR: CPT | Performed by: STUDENT IN AN ORGANIZED HEALTH CARE EDUCATION/TRAINING PROGRAM

## 2024-12-11 PROCEDURE — 90662 IIV NO PRSV INCREASED AG IM: CPT | Performed by: STUDENT IN AN ORGANIZED HEALTH CARE EDUCATION/TRAINING PROGRAM

## 2024-12-11 PROCEDURE — 1160F RVW MEDS BY RX/DR IN RCRD: CPT | Performed by: STUDENT IN AN ORGANIZED HEALTH CARE EDUCATION/TRAINING PROGRAM

## 2024-12-11 PROCEDURE — 1159F MED LIST DOCD IN RCRD: CPT | Performed by: STUDENT IN AN ORGANIZED HEALTH CARE EDUCATION/TRAINING PROGRAM

## 2024-12-11 PROCEDURE — 3074F SYST BP LT 130 MM HG: CPT | Performed by: STUDENT IN AN ORGANIZED HEALTH CARE EDUCATION/TRAINING PROGRAM

## 2024-12-11 PROCEDURE — 4010F ACE/ARB THERAPY RXD/TAKEN: CPT | Performed by: STUDENT IN AN ORGANIZED HEALTH CARE EDUCATION/TRAINING PROGRAM

## 2024-12-11 PROCEDURE — 1036F TOBACCO NON-USER: CPT | Performed by: STUDENT IN AN ORGANIZED HEALTH CARE EDUCATION/TRAINING PROGRAM

## 2024-12-11 PROCEDURE — 3008F BODY MASS INDEX DOCD: CPT | Performed by: STUDENT IN AN ORGANIZED HEALTH CARE EDUCATION/TRAINING PROGRAM

## 2024-12-11 PROCEDURE — 3078F DIAST BP <80 MM HG: CPT | Performed by: STUDENT IN AN ORGANIZED HEALTH CARE EDUCATION/TRAINING PROGRAM

## 2024-12-11 PROCEDURE — 3048F LDL-C <100 MG/DL: CPT | Performed by: STUDENT IN AN ORGANIZED HEALTH CARE EDUCATION/TRAINING PROGRAM

## 2024-12-11 PROCEDURE — 1124F ACP DISCUSS-NO DSCNMKR DOCD: CPT | Performed by: STUDENT IN AN ORGANIZED HEALTH CARE EDUCATION/TRAINING PROGRAM

## 2024-12-11 RX ORDER — ATORVASTATIN CALCIUM 80 MG/1
80 TABLET, FILM COATED ORAL NIGHTLY
Qty: 90 TABLET | Refills: 3 | Status: SHIPPED | OUTPATIENT
Start: 2024-12-11 | End: 2025-12-11

## 2024-12-11 RX ORDER — AMLODIPINE BESYLATE 10 MG/1
10 TABLET ORAL DAILY
Qty: 90 TABLET | Refills: 3 | Status: SHIPPED | OUTPATIENT
Start: 2024-12-11

## 2024-12-11 RX ORDER — ZOSTER VACCINE RECOMBINANT, ADJUVANTED 50 MCG/0.5
KIT INTRAMUSCULAR
COMMUNITY
Start: 2024-02-16

## 2024-12-11 RX ORDER — ALPRAZOLAM 0.5 MG/1
0.5 TABLET ORAL 3 TIMES DAILY PRN
Qty: 90 TABLET | Refills: 0 | Status: SHIPPED | OUTPATIENT
Start: 2024-12-11

## 2024-12-11 ASSESSMENT — ACTIVITIES OF DAILY LIVING (ADL)
GROCERY_SHOPPING: INDEPENDENT
TAKING_MEDICATION: INDEPENDENT
DRESSING: INDEPENDENT
MANAGING_FINANCES: INDEPENDENT
BATHING: INDEPENDENT
DRESSING: INDEPENDENT
TAKING_MEDICATION: INDEPENDENT
DOING_HOUSEWORK: INDEPENDENT
BATHING: INDEPENDENT
MANAGING_FINANCES: INDEPENDENT
DOING_HOUSEWORK: INDEPENDENT
GROCERY_SHOPPING: INDEPENDENT

## 2024-12-11 ASSESSMENT — ENCOUNTER SYMPTOMS
LOSS OF SENSATION IN FEET: 0
OCCASIONAL FEELINGS OF UNSTEADINESS: 0
DEPRESSION: 0

## 2024-12-11 ASSESSMENT — PATIENT HEALTH QUESTIONNAIRE - PHQ9
1. LITTLE INTEREST OR PLEASURE IN DOING THINGS: NOT AT ALL
SUM OF ALL RESPONSES TO PHQ9 QUESTIONS 1 AND 2: 0
2. FEELING DOWN, DEPRESSED OR HOPELESS: NOT AT ALL
SUM OF ALL RESPONSES TO PHQ9 QUESTIONS 1 AND 2: 0
2. FEELING DOWN, DEPRESSED OR HOPELESS: NOT AT ALL
1. LITTLE INTEREST OR PLEASURE IN DOING THINGS: NOT AT ALL

## 2024-12-11 NOTE — PROGRESS NOTES
Subjective   Reason for Visit: Keith Fregoso is an 64 y.o. female here for a Medicare Wellness visit.               HPI    Patient Care Team:  Christopher D'Amico, DO as PCP - General  Christopher D'Amico, DO as PCP - [employer] PCP     Review of Systems    Objective   Vitals:  /79   Pulse 91   Ht 1.524 m (5')   Wt 79.4 kg (175 lb)   LMP  (LMP Unknown)   SpO2 94%   BMI 34.18 kg/m²       Physical Exam    Assessment & Plan  Essential hypertension    Orders:    CBC; Future    Lipid Panel; Future    Comprehensive Metabolic Panel; Future    TSH with reflex to Free T4 if abnormal; Future    Hemoglobin A1C; Future    Albumin-Creatinine Ratio, Urine Random; Future    Type 2 diabetes mellitus without complication, without long-term current use of insulin (Multi)    Orders:    CBC; Future    Lipid Panel; Future    Comprehensive Metabolic Panel; Future    TSH with reflex to Free T4 if abnormal; Future    Hemoglobin A1C; Future    Albumin-Creatinine Ratio, Urine Random; Future    Hyperlipidemia, unspecified hyperlipidemia type    Orders:    CBC; Future    Lipid Panel; Future    Comprehensive Metabolic Panel; Future    TSH with reflex to Free T4 if abnormal; Future    Hemoglobin A1C; Future    Albumin-Creatinine Ratio, Urine Random; Future    Anxiety disorder, unspecified type    Orders:    CBC; Future    Lipid Panel; Future    Comprehensive Metabolic Panel; Future    TSH with reflex to Free T4 if abnormal; Future    Hemoglobin A1C; Future    Albumin-Creatinine Ratio, Urine Random; Future    Benzodiazepine contract exists    Orders:    CBC; Future    Lipid Panel; Future    Comprehensive Metabolic Panel; Future    TSH with reflex to Free T4 if abnormal; Future    Hemoglobin A1C; Future    Albumin-Creatinine Ratio, Urine Random; Future    Vitamin D deficiency    Orders:    CBC; Future    Lipid Panel; Future    Comprehensive Metabolic Panel; Future    TSH with reflex to Free T4 if abnormal; Future     Vitamin D 25-Hydroxy,Total (for eval of Vitamin D levels); Future    Hemoglobin A1C; Future    Albumin-Creatinine Ratio, Urine Random; Future    Encounter for screening mammogram for malignant neoplasm of breast    Orders:    CBC; Future    Lipid Panel; Future    Comprehensive Metabolic Panel; Future    TSH with reflex to Free T4 if abnormal; Future    Hemoglobin A1C; Future    Albumin-Creatinine Ratio, Urine Random; Future    BI mammo bilateral screening tomosynthesis; Future    Screening for colon cancer    Orders:    CBC; Future    Lipid Panel; Future    Comprehensive Metabolic Panel; Future    TSH with reflex to Free T4 if abnormal; Future    Hemoglobin A1C; Future    Albumin-Creatinine Ratio, Urine Random; Future    Colonoscopy Screening; Average Risk Patient; Future    Asymptomatic menopausal state    Orders:    CBC; Future    Lipid Panel; Future    Comprehensive Metabolic Panel; Future    TSH with reflex to Free T4 if abnormal; Future    Hemoglobin A1C; Future    Albumin-Creatinine Ratio, Urine Random; Future    XR DEXA bone density; Future                64-year-old female presents for follow-up on multiple concerns, Medicare annual wellness exam/CPE.  Doing relatively well without any significant new concerns or interval changes.    HTN  Stable, tolerating current regimen well.     DMII  Stable, tolerating current regimen well.  Following with [facility] pharmacist.     Anxiety  Stable on current regimen.  CSA and UDS up-to-date     Right knee pain  Following with orthopedics    SocHx:   - Smoking: Never    12 point ROS reviewed and negative other than as stated in HPI      General: Alert, oriented, pleasant, in no acute distress  HEENT:      Head: normocephalic, atraumatic;      eyes: EOMI, no scleral icterus;   Neck: soft, supple, non-tender, no masses appreciated  CV: Heart with regular rate and rhythm, normal S1/S2, no murmurs  Lungs: CTAB without wheezing, rhonchi or rales; good respiratory effort, no  increased work of breathing  Abdomen: soft, non-tender, non-distended, no masses appreciated, limited by body habitus  Extremities: no edema, no cyanosis  Neuro: Cranial nerves grossly intact; alert and oriented  Psych: Appropriate mood and affect    #HM  -CBC, CMP, Lipid panel, Vit D, TSH with reflex T4  -Vaccines:       Flu: Given in office      Shingrix: Recommended, advised to go to local pharmacy      Pneumococcal: UTD      Tdap: Recommended, advised to go to local pharmacy  -Sanam w/ angelita: Ordered  -Lung cancer screening with low-dose CT: never smoker  -Colonoscopy: Ordered  -Osteoporosis screening: Ordered    #HTN  - At goal in office  -Continue olmesartan 20 mg daily  -Repeat CMP     #DMII  -Last A1c 7.6, 06/2024  -UTD diabetic eye exam  - Diabetic foot exam negative in office  -Currently on metformin 1000 mg twice daily, Trulicity 4.5 mg weekly, Farxiga 10 mg daily  -Continue to follow with [facility] pharmacist  -Repeat A1c, albumin    #HLD  - Continue atorvastatin 40 mg daily  - Repeat lipid panel    #Anxiety  -Continue citalopram 20 mg daily, alprazolam 0.5 mg 3 times daily as needed  - CSA updated 06/2024, UDS updated 06/2024     #R knee pain  -Plain film with moderate arthritis  - Continue to follow with orthopedics    F/U 6 months, sooner if indicated    Chris D'Amico,

## 2024-12-11 NOTE — ASSESSMENT & PLAN NOTE
Orders:    CBC; Future    Lipid Panel; Future    Comprehensive Metabolic Panel; Future    TSH with reflex to Free T4 if abnormal; Future    Vitamin D 25-Hydroxy,Total (for eval of Vitamin D levels); Future    Hemoglobin A1C; Future    Albumin-Creatinine Ratio, Urine Random; Future

## 2024-12-11 NOTE — ASSESSMENT & PLAN NOTE
Orders:    CBC; Future    Lipid Panel; Future    Comprehensive Metabolic Panel; Future    TSH with reflex to Free T4 if abnormal; Future    Hemoglobin A1C; Future    Albumin-Creatinine Ratio, Urine Random; Future

## 2024-12-12 ENCOUNTER — LAB (OUTPATIENT)
Dept: LAB | Facility: LAB | Age: 64
End: 2024-12-12
Payer: MEDICARE

## 2024-12-12 DIAGNOSIS — Z12.11 SCREENING FOR COLON CANCER: ICD-10-CM

## 2024-12-12 DIAGNOSIS — I10 ESSENTIAL HYPERTENSION: ICD-10-CM

## 2024-12-12 DIAGNOSIS — E11.9 TYPE 2 DIABETES MELLITUS WITHOUT COMPLICATION, WITHOUT LONG-TERM CURRENT USE OF INSULIN (MULTI): ICD-10-CM

## 2024-12-12 DIAGNOSIS — F41.9 ANXIETY DISORDER, UNSPECIFIED TYPE: ICD-10-CM

## 2024-12-12 DIAGNOSIS — Z12.31 ENCOUNTER FOR SCREENING MAMMOGRAM FOR MALIGNANT NEOPLASM OF BREAST: ICD-10-CM

## 2024-12-12 DIAGNOSIS — E55.9 VITAMIN D DEFICIENCY: ICD-10-CM

## 2024-12-12 DIAGNOSIS — Z78.0 ASYMPTOMATIC MENOPAUSAL STATE: ICD-10-CM

## 2024-12-12 DIAGNOSIS — Z79.899 BENZODIAZEPINE CONTRACT EXISTS: ICD-10-CM

## 2024-12-12 DIAGNOSIS — E78.5 HYPERLIPIDEMIA, UNSPECIFIED HYPERLIPIDEMIA TYPE: ICD-10-CM

## 2024-12-12 LAB
25(OH)D3 SERPL-MCNC: 82 NG/ML (ref 30–100)
ALBUMIN SERPL BCP-MCNC: 4.3 G/DL (ref 3.4–5)
ALP SERPL-CCNC: 79 U/L (ref 33–136)
ALT SERPL W P-5'-P-CCNC: 37 U/L (ref 7–45)
ANION GAP SERPL CALC-SCNC: 11 MMOL/L (ref 10–20)
AST SERPL W P-5'-P-CCNC: 21 U/L (ref 9–39)
BILIRUB SERPL-MCNC: 0.5 MG/DL (ref 0–1.2)
BUN SERPL-MCNC: 21 MG/DL (ref 6–23)
CALCIUM SERPL-MCNC: 9.5 MG/DL (ref 8.6–10.6)
CHLORIDE SERPL-SCNC: 103 MMOL/L (ref 98–107)
CHOLEST SERPL-MCNC: 142 MG/DL (ref 0–199)
CHOLESTEROL/HDL RATIO: 3.4
CO2 SERPL-SCNC: 30 MMOL/L (ref 21–32)
CREAT SERPL-MCNC: 0.81 MG/DL (ref 0.5–1.05)
CREAT UR-MCNC: 98.5 MG/DL (ref 20–320)
EGFRCR SERPLBLD CKD-EPI 2021: 80 ML/MIN/1.73M*2
ERYTHROCYTE [DISTWIDTH] IN BLOOD BY AUTOMATED COUNT: 12.4 % (ref 11.5–14.5)
EST. AVERAGE GLUCOSE BLD GHB EST-MCNC: 192 MG/DL
GLUCOSE SERPL-MCNC: 166 MG/DL (ref 74–99)
HBA1C MFR BLD: 8.3 %
HCT VFR BLD AUTO: 41.2 % (ref 36–46)
HDLC SERPL-MCNC: 41.2 MG/DL
HGB BLD-MCNC: 13.2 G/DL (ref 12–16)
LDLC SERPL CALC-MCNC: 75 MG/DL
MCH RBC QN AUTO: 28.6 PG (ref 26–34)
MCHC RBC AUTO-ENTMCNC: 32 G/DL (ref 32–36)
MCV RBC AUTO: 89 FL (ref 80–100)
MICROALBUMIN UR-MCNC: 18.3 MG/L
MICROALBUMIN/CREAT UR: 18.6 UG/MG CREAT
NON HDL CHOLESTEROL: 101 MG/DL (ref 0–149)
NRBC BLD-RTO: 0 /100 WBCS (ref 0–0)
PLATELET # BLD AUTO: 274 X10*3/UL (ref 150–450)
POTASSIUM SERPL-SCNC: 4.7 MMOL/L (ref 3.5–5.3)
PROT SERPL-MCNC: 6.5 G/DL (ref 6.4–8.2)
RBC # BLD AUTO: 4.62 X10*6/UL (ref 4–5.2)
SODIUM SERPL-SCNC: 139 MMOL/L (ref 136–145)
TRIGL SERPL-MCNC: 130 MG/DL (ref 0–149)
TSH SERPL-ACNC: 1.34 MIU/L (ref 0.44–3.98)
VLDL: 26 MG/DL (ref 0–40)
WBC # BLD AUTO: 8.8 X10*3/UL (ref 4.4–11.3)

## 2024-12-12 PROCEDURE — 85027 COMPLETE CBC AUTOMATED: CPT

## 2024-12-12 PROCEDURE — 82306 VITAMIN D 25 HYDROXY: CPT

## 2024-12-12 PROCEDURE — 36415 COLL VENOUS BLD VENIPUNCTURE: CPT

## 2024-12-12 PROCEDURE — 82570 ASSAY OF URINE CREATININE: CPT

## 2024-12-12 PROCEDURE — 82043 UR ALBUMIN QUANTITATIVE: CPT

## 2024-12-12 PROCEDURE — 83036 HEMOGLOBIN GLYCOSYLATED A1C: CPT

## 2024-12-12 PROCEDURE — 80053 COMPREHEN METABOLIC PANEL: CPT

## 2024-12-12 PROCEDURE — 80061 LIPID PANEL: CPT

## 2024-12-12 PROCEDURE — 84443 ASSAY THYROID STIM HORMONE: CPT

## 2024-12-12 NOTE — LETTER
December 16, 2024     Keith Fregoso  [address] [place] [address]      Dear Ms. Fregoso:    Below are the results from your recent visit:      Hemoglobin A1c at 8.3, has increased from recent labs, really need to work on the diet, reduction in carbohydrates, such as bread, pasta, dessert food, sugary beverages, etc.     Remaining labs mostly unremarkable.           If you have any questions or concerns, please don't hesitate to call [phone] ([place]wal)

## 2024-12-13 NOTE — RESULT ENCOUNTER NOTE
Hemoglobin A1c at 8.3, has increased from recent labs, really need to work on the diet, reduction in carbohydrates, such as bread, pasta, dessert food, sugary beverages, etc.    Remaining labs mostly unremarkable.

## 2024-12-16 ENCOUNTER — TELEPHONE (OUTPATIENT)
Dept: PRIMARY CARE | Facility: CLINIC | Age: 64
End: 2024-12-16
Payer: MEDICARE

## 2024-12-16 NOTE — TELEPHONE ENCOUNTER
----- Message from Christopher D'Amico sent at 12/13/2024  9:17 AM EST -----  Hemoglobin A1c at 8.3, has increased from recent labs, really need to work on the diet, reduction in carbohydrates, such as bread, pasta, dessert food, sugary beverages, etc.    Remaining labs mostly unremarkable.

## 2025-01-02 ENCOUNTER — TELEMEDICINE (OUTPATIENT)
Dept: PHARMACY | Facility: HOSPITAL | Age: 65
End: 2025-01-02
Payer: MEDICARE

## 2025-01-02 DIAGNOSIS — E11.9 DIABETES MELLITUS TYPE 2 WITHOUT RETINOPATHY (MULTI): ICD-10-CM

## 2025-01-02 PROCEDURE — RXMED WILLOW AMBULATORY MEDICATION CHARGE

## 2025-01-02 RX ORDER — TIRZEPATIDE 7.5 MG/.5ML
7.5 INJECTION, SOLUTION SUBCUTANEOUS WEEKLY
Qty: 6 ML | Refills: 1 | Status: SHIPPED | OUTPATIENT
Start: 2025-01-02

## 2025-01-02 NOTE — PROGRESS NOTES
Pharmacist Clinic: Diabetes Management  Keith Fregoso is a 64 y.o. female was referred to Clinical Pharmacy Team for diabetes management.     Referring Provider: Christopher D'Amico, DO     HISTORY OF PRESENT ILLNESS  Approximate Date of Diagnosis: approx 20 years ago  Comborbidities: obesity, CKD  Diet: patient states she knows she does not eat healthy, she needs to work on it   Exercise Routine: minimal given her leg problems     LAB REVIEW   Glucose (mg/dL)   Date Value   12/12/2024 166 (H)   06/14/2024 152 (H)   03/22/2024 139 (H)     Hemoglobin A1C (%)   Date Value   12/12/2024 8.3 (H)   06/14/2024 7.6 (H)   03/22/2024 7.4 (H)     Bicarbonate (mmol/L)   Date Value   12/12/2024 30   06/14/2024 25   03/22/2024 25     Urea Nitrogen (mg/dL)   Date Value   12/12/2024 21   06/14/2024 24 (H)   03/22/2024 23     Creatinine (mg/dL)   Date Value   12/12/2024 0.81   06/14/2024 0.66   03/22/2024 0.69     Lab Results   Component Value Date    CHOL 142 12/12/2024    CHOL 163 06/14/2024    CHOL 168 03/22/2024     Lab Results   Component Value Date    HDL 41.2 12/12/2024    HDL 53.0 06/14/2024    HDL 48.4 03/22/2024     Lab Results   Component Value Date    LDLCALC 75 12/12/2024    LDLCALC 83 06/14/2024    LDLCALC 96 03/22/2024     Lab Results   Component Value Date    TRIG 130 12/12/2024    TRIG 133 06/14/2024    TRIG 116 03/22/2024     DIABETES ASSESSMENT    CURRENT PHARMACOTHERAPY  - metformin 500 mg twice daily   - mounjaro 7.5 mg once weekly     SECONDARY PREVENTION  - Statin? Yes  - ACE-I/ARB? Yes, patient appears to be non-adherent     SMBG MEASUREMENTS: 120-150     HISTORICAL PHARMACOTHERAPY   - jardiance: vaginal itchiness  - farxiga: vaginal itchiness   - trulicity 4.5 mg under the skin once weekly: changed to mounjaro     DISCUSSION:  Last A1C came back at 8.3%  Since last A1c, patient was switched from Trulicity to mounjaro for BG lowering benefits   She is tolerating the medication without issues, she denies any  questions or concerns at this time   She does not want a dose adjustment as her sugars are looking better, especially since she has started eating better     RECOMMENDATIONS/PLAN  1. Patients diabetes is worsening with most recent A1c of 8.3 % (goal < 7 %).   - Continue all meds under the continuation of care with the referring provider and clinical pharmacy team.    Clinical Pharmacist follow up: 3 months   [facility] PAP Approval: 8/22/2024    Thank you,  Alejandra Espinal, PharmD    Verbal consent to manage patient's drug therapy was obtained from the patient. They were informed they may decline to participate or withdraw from participation in pharmacy services at any time.

## 2025-01-06 ENCOUNTER — PHARMACY VISIT (OUTPATIENT)
Dept: PHARMACY | Facility: CLINIC | Age: 65
End: 2025-01-06
Payer: MEDICARE

## 2025-01-08 ENCOUNTER — HOSPITAL ENCOUNTER (OUTPATIENT)
Dept: RADIOLOGY | Facility: CLINIC | Age: 65
Discharge: HOME | End: 2025-01-08
Payer: MEDICARE

## 2025-01-08 VITALS — HEIGHT: 60 IN | BODY MASS INDEX: 34.36 KG/M2 | WEIGHT: 175 LBS

## 2025-01-08 DIAGNOSIS — Z12.31 ENCOUNTER FOR SCREENING MAMMOGRAM FOR MALIGNANT NEOPLASM OF BREAST: ICD-10-CM

## 2025-01-08 PROCEDURE — 77067 SCR MAMMO BI INCL CAD: CPT

## 2025-01-08 PROCEDURE — 77063 BREAST TOMOSYNTHESIS BI: CPT | Performed by: RADIOLOGY

## 2025-01-08 PROCEDURE — 77067 SCR MAMMO BI INCL CAD: CPT | Performed by: RADIOLOGY

## 2025-01-13 ENCOUNTER — TELEPHONE (OUTPATIENT)
Dept: PRIMARY CARE | Facility: CLINIC | Age: 65
End: 2025-01-13
Payer: MEDICARE

## 2025-01-13 NOTE — TELEPHONE ENCOUNTER
----- Message from Christopher D'Amico sent at 1/10/2025 10:05 AM EST -----  Mammogram negative, continue with annual screening.

## 2025-01-13 NOTE — TELEPHONE ENCOUNTER
Result Communication    Resulted Orders   BI mammo bilateral screening tomosynthesis    Narrative    Interpreted By:  Nella Rai,   STUDY:  BI MAMMO BILATERAL SCREENING TOMOSYNTHESIS;  1/8/2025 12:20 pm      ACCESSION NUMBER(S):  [id number]      ORDERING CLINICIAN:  CHRISTOPHER D'AMICO      INDICATION:  Screening.      ,Z12.31 Encounter for screening mammogram for malignant neoplasm of  breast      COMPARISON:  09/29/2021 06/15/2018      FINDINGS:  2D and tomosynthesis images were reviewed at 1 mm slice thickness.      Density:  There are scattered areas of fibroglandular density.      No suspicious masses or calcifications are identified.        Impression    No mammographic evidence of malignancy.      BI-RADS CATEGORY:  BI-RADS Category:  1 Negative.  Recommendation:  Annual Screening.  Recommended Date:  1 Year.  Laterality:  Bilateral.              For any future breast imaging appointments, please call [phone]).          MACRO:  None      Signed by: Nella Rai 1/9/2025 1:52 PM  Dictation workstation:   [id number]       11:52 AM      Results were successfully communicated with the patient and they acknowledged their understanding.

## 2025-01-22 ENCOUNTER — HOSPITAL ENCOUNTER (OUTPATIENT)
Dept: RADIOLOGY | Facility: CLINIC | Age: 65
Discharge: HOME | End: 2025-01-22
Payer: MEDICARE

## 2025-01-22 DIAGNOSIS — Z78.0 ASYMPTOMATIC MENOPAUSAL STATE: ICD-10-CM

## 2025-01-22 PROCEDURE — 77080 DXA BONE DENSITY AXIAL: CPT | Performed by: RADIOLOGY

## 2025-01-22 PROCEDURE — 77080 DXA BONE DENSITY AXIAL: CPT

## 2025-01-23 NOTE — RESULT ENCOUNTER NOTE
DEXA scan shows osteopenia, which is decreased bone density without being enough to be considered osteoporosis.  Should be on calcium supplementation of 1200 mg daily, and if not already on vitamin D supplementation, at least 600 international units daily.  Weightbearing exercises such as weight lifting can help improve bone density, and discontinuing smoking if a smoker.  Continue with 2-year screenings.

## 2025-01-27 ENCOUNTER — TELEPHONE (OUTPATIENT)
Dept: PRIMARY CARE | Facility: CLINIC | Age: 65
End: 2025-01-27
Payer: MEDICARE

## 2025-01-27 NOTE — TELEPHONE ENCOUNTER
----- Message from Christopher D'Amico sent at 1/23/2025  3:09 PM EST -----  DEXA scan shows osteopenia, which is decreased bone density without being enough to be considered osteoporosis.  Should be on calcium supplementation of 1200 mg daily, and if not already on vitamin D supplementation, at least 600 international units daily.  Weightbearing exercises such as weight lifting can help improve bone density, and discontinuing smoking if a smoker.  Continue with 2-year screenings.

## 2025-01-27 NOTE — TELEPHONE ENCOUNTER
Result Communication    Resulted Orders   XR DEXA bone density    Narrative    Interpreted By:  Jamir Landis,   STUDY:  DEXA BONE DENSITY1/22/2025 11:31 am      INDICATION:  Signs/Symptoms:Screen. The patient is a 65 y/o  year old F.  ,Z78.0 Asymptomatic menopausal state      COMPARISON:  Previous exam from [facility] dated 07/13/2015, at which  time there was normal bone density..      ACCESSION NUMBER(S):  [id number]      ORDERING CLINICIAN:  CHRISTOPHER D'AMICO      TECHNIQUE:  DEXA BONE DENSITY      FINDINGS:  SPINE L1-L4  Bone Mineral Density: 1.167  T-Score 1.1  Z-Score 2.9  Bone Mineral Density change vs baseline:  Not reported  Bone Mineral Density change vs previous: Not reported      LEFT FEMUR -TOTAL  Bone Mineral Density: 1.01  T-Score 0.6   Z-Score  1.8  Bone Mineral Density change vs baseline: Not reported  Bone Mineral Density change vs previous: Not reported      LEFT FEMUR -NECK  Bone Mineral Density: 0.663  T-Score -1.7  Z-Score -0.1  Bone Mineral Density change vs baseline: Not reported  Bone Mineral Density change vs previous: Not reported          World Health Organization (WHO) criteria for post-menopausal,   Women:  Normal:         T-score at or above -1 SD  Osteopenia:   T-score between -1 and -2.5 SD  Osteoporosis: T-score at or below -2.5 SD          10-year Fracture Risk:  Major Osteoporotic Fracture  8.9  Hip Fracture                        1.0      Note:  If no FRAX score is reported, it is because:  Some T-score for Spine Total or Hip Total or Femoral Neck at or below  -2.5            Impression    DEXA:  According to World Health Organization criteria,  classification is low bone mass (osteopenia)      Followup recommended in two years or sooner as clinically warranted.      All images and detailed analysis are available on the  Radiology  PACS.      MACRO:  None      Signed by: Jamir Landis 1/22/2025 12:57 PM  Dictation workstation:   [id number]       1:20  PM      Results were successfully communicated with the patient and they acknowledged their understanding.

## 2025-01-28 DIAGNOSIS — E55.9 VITAMIN D DEFICIENCY: ICD-10-CM

## 2025-01-31 RX ORDER — ERGOCALCIFEROL 1.25 MG/1
1 CAPSULE ORAL
Qty: 12 CAPSULE | Refills: 3 | Status: SHIPPED | OUTPATIENT
Start: 2025-02-02

## 2025-02-05 DIAGNOSIS — M85.80 OSTEOPENIA, UNSPECIFIED LOCATION: Primary | ICD-10-CM

## 2025-02-05 RX ORDER — MULTIVITAMIN
1 TABLET ORAL 2 TIMES DAILY
Qty: 180 TABLET | Refills: 1 | Status: SHIPPED | OUTPATIENT
Start: 2025-02-05 | End: 2025-08-04

## 2025-03-06 ENCOUNTER — APPOINTMENT (OUTPATIENT)
Dept: PHARMACY | Facility: HOSPITAL | Age: 65
End: 2025-03-06
Payer: MEDICARE

## 2025-03-06 DIAGNOSIS — E11.9 DIABETES MELLITUS TYPE 2 WITHOUT RETINOPATHY (MULTI): Primary | ICD-10-CM

## 2025-03-06 NOTE — PROGRESS NOTES
Pharmacist Clinic: Diabetes Management  Keith Fregoso is a 64 y.o. female was referred to Clinical Pharmacy Team for diabetes management.     Referring Provider: Christopher D'Amico, DO     HISTORY OF PRESENT ILLNESS  Approximate Date of Diagnosis: approx 20 years ago  Comborbidities: obesity, CKD  Diet: patient states she knows she does not eat healthy, she needs to work on it   Exercise Routine: minimal given her leg problems     LAB REVIEW   Glucose (mg/dL)   Date Value   12/12/2024 166 (H)   06/14/2024 152 (H)   03/22/2024 139 (H)     Hemoglobin A1C (%)   Date Value   12/12/2024 8.3 (H)   06/14/2024 7.6 (H)   03/22/2024 7.4 (H)     Bicarbonate (mmol/L)   Date Value   12/12/2024 30   06/14/2024 25   03/22/2024 25     Urea Nitrogen (mg/dL)   Date Value   12/12/2024 21   06/14/2024 24 (H)   03/22/2024 23     Creatinine (mg/dL)   Date Value   12/12/2024 0.81   06/14/2024 0.66   03/22/2024 0.69     Lab Results   Component Value Date    CHOL 142 12/12/2024    CHOL 163 06/14/2024    CHOL 168 03/22/2024     Lab Results   Component Value Date    HDL 41.2 12/12/2024    HDL 53.0 06/14/2024    HDL 48.4 03/22/2024     Lab Results   Component Value Date    LDLCALC 75 12/12/2024    LDLCALC 83 06/14/2024    LDLCALC 96 03/22/2024     Lab Results   Component Value Date    TRIG 130 12/12/2024    TRIG 133 06/14/2024    TRIG 116 03/22/2024     DIABETES ASSESSMENT    CURRENT PHARMACOTHERAPY  - metformin 500 mg twice daily   - mounjaro 7.5 mg once weekly     SECONDARY PREVENTION  - Statin? Yes  - ACE-I/ARB? Yes, patient appears to be non-adherent     SMBG MEASUREMENTS: 120-140    HISTORICAL PHARMACOTHERAPY   - jardiance: vaginal itchiness  - farxiga: vaginal itchiness   - trulicity 4.5 mg under the skin once weekly: changed to mounjaro     DISCUSSION:  Last A1C came back at 8.3%  Since last A1c, patient was switched from Trulicity to mounjaro for BG lowering benefits   She is tolerating the medication without issues, she denies any  questions or concerns at this time   She does not want a dose adjustment as her sugars are looking better, especially since she has started eating better     RECOMMENDATIONS/PLAN  1. Patients diabetes is worsening with most recent A1c of 8.3 % (goal < 7 %). Since last A1c, patient was transitioned to mounjaro for better BG lowering. Deferring medication changes until next A1c.  - Continue all meds under the continuation of care with the referring provider and clinical pharmacy team.    Clinical Pharmacist follow up: 3 months   [facility] PAP Approval: 8/22/2024    Thank you,  Alejandra Espinal, PharmD    Verbal consent to manage patient's drug therapy was obtained from the patient. They were informed they may decline to participate or withdraw from participation in pharmacy services at any time.

## 2025-03-13 PROCEDURE — RXMED WILLOW AMBULATORY MEDICATION CHARGE

## 2025-03-17 DIAGNOSIS — E11.69 TYPE 2 DIABETES MELLITUS WITH OTHER SPECIFIED COMPLICATION, UNSPECIFIED WHETHER LONG TERM INSULIN USE (MULTI): ICD-10-CM

## 2025-03-17 RX ORDER — METFORMIN HYDROCHLORIDE 500 MG/1
500 TABLET ORAL 2 TIMES DAILY
Qty: 180 TABLET | Refills: 1 | Status: SHIPPED | OUTPATIENT
Start: 2025-03-17

## 2025-03-18 ENCOUNTER — PHARMACY VISIT (OUTPATIENT)
Dept: PHARMACY | Facility: CLINIC | Age: 65
End: 2025-03-18
Payer: MEDICARE

## 2025-04-30 DIAGNOSIS — F41.9 ANXIETY: ICD-10-CM

## 2025-04-30 DIAGNOSIS — E55.9 VITAMIN D DEFICIENCY: ICD-10-CM

## 2025-04-30 RX ORDER — ERGOCALCIFEROL 1.25 MG/1
1.25 CAPSULE ORAL
Qty: 12 CAPSULE | Refills: 3 | Status: SHIPPED | OUTPATIENT
Start: 2025-05-04

## 2025-04-30 RX ORDER — ALPRAZOLAM 0.5 MG/1
0.5 TABLET ORAL 3 TIMES DAILY PRN
Qty: 90 TABLET | Refills: 0 | Status: SHIPPED | OUTPATIENT
Start: 2025-04-30

## 2025-05-08 DIAGNOSIS — M85.80 OSTEOPENIA, UNSPECIFIED LOCATION: ICD-10-CM

## 2025-05-08 DIAGNOSIS — R42 VERTIGO: ICD-10-CM

## 2025-05-08 RX ORDER — MECLIZINE HYDROCHLORIDE 25 MG/1
TABLET ORAL
Qty: 180 TABLET | Refills: 2 | Status: SHIPPED | OUTPATIENT
Start: 2025-05-08

## 2025-05-08 RX ORDER — MULTIVITAMIN
1 TABLET ORAL 2 TIMES DAILY
Qty: 180 TABLET | Refills: 1 | Status: SHIPPED | OUTPATIENT
Start: 2025-05-08 | End: 2025-11-04

## 2025-06-04 DIAGNOSIS — E11.9 DIABETES MELLITUS TYPE 2 WITHOUT RETINOPATHY (MULTI): ICD-10-CM

## 2025-06-04 PROCEDURE — RXMED WILLOW AMBULATORY MEDICATION CHARGE

## 2025-06-04 RX ORDER — TIRZEPATIDE 7.5 MG/.5ML
7.5 INJECTION, SOLUTION SUBCUTANEOUS WEEKLY
Qty: 6 ML | Refills: 1 | Status: SHIPPED | OUTPATIENT
Start: 2025-06-04

## 2025-06-06 ENCOUNTER — PHARMACY VISIT (OUTPATIENT)
Dept: PHARMACY | Facility: CLINIC | Age: 65
End: 2025-06-06
Payer: MEDICARE

## 2025-06-25 ENCOUNTER — APPOINTMENT (OUTPATIENT)
Dept: PRIMARY CARE | Facility: CLINIC | Age: 65
End: 2025-06-25
Payer: MEDICARE

## 2025-06-25 VITALS
SYSTOLIC BLOOD PRESSURE: 116 MMHG | HEIGHT: 60 IN | HEART RATE: 80 BPM | OXYGEN SATURATION: 94 % | DIASTOLIC BLOOD PRESSURE: 71 MMHG | WEIGHT: 172 LBS | BODY MASS INDEX: 33.77 KG/M2

## 2025-06-25 DIAGNOSIS — G89.29 CHRONIC PAIN OF RIGHT KNEE: ICD-10-CM

## 2025-06-25 DIAGNOSIS — G89.29 CHRONIC LOW BACK PAIN, UNSPECIFIED BACK PAIN LATERALITY, UNSPECIFIED WHETHER SCIATICA PRESENT: ICD-10-CM

## 2025-06-25 DIAGNOSIS — Z79.899 BENZODIAZEPINE CONTRACT EXISTS: ICD-10-CM

## 2025-06-25 DIAGNOSIS — E11.9 TYPE 2 DIABETES MELLITUS WITHOUT COMPLICATION, WITHOUT LONG-TERM CURRENT USE OF INSULIN: ICD-10-CM

## 2025-06-25 DIAGNOSIS — I10 ESSENTIAL HYPERTENSION: Primary | ICD-10-CM

## 2025-06-25 DIAGNOSIS — F41.9 ANXIETY DISORDER, UNSPECIFIED TYPE: ICD-10-CM

## 2025-06-25 DIAGNOSIS — F41.9 ANXIETY: ICD-10-CM

## 2025-06-25 DIAGNOSIS — I15.9 SECONDARY HYPERTENSION: ICD-10-CM

## 2025-06-25 DIAGNOSIS — E78.5 HYPERLIPIDEMIA, UNSPECIFIED HYPERLIPIDEMIA TYPE: ICD-10-CM

## 2025-06-25 DIAGNOSIS — M25.561 CHRONIC PAIN OF RIGHT KNEE: ICD-10-CM

## 2025-06-25 DIAGNOSIS — M54.50 CHRONIC LOW BACK PAIN, UNSPECIFIED BACK PAIN LATERALITY, UNSPECIFIED WHETHER SCIATICA PRESENT: ICD-10-CM

## 2025-06-25 PROCEDURE — G2211 COMPLEX E/M VISIT ADD ON: HCPCS | Performed by: STUDENT IN AN ORGANIZED HEALTH CARE EDUCATION/TRAINING PROGRAM

## 2025-06-25 PROCEDURE — 1160F RVW MEDS BY RX/DR IN RCRD: CPT | Performed by: STUDENT IN AN ORGANIZED HEALTH CARE EDUCATION/TRAINING PROGRAM

## 2025-06-25 PROCEDURE — 99214 OFFICE O/P EST MOD 30 MIN: CPT | Performed by: STUDENT IN AN ORGANIZED HEALTH CARE EDUCATION/TRAINING PROGRAM

## 2025-06-25 PROCEDURE — 1124F ACP DISCUSS-NO DSCNMKR DOCD: CPT | Performed by: STUDENT IN AN ORGANIZED HEALTH CARE EDUCATION/TRAINING PROGRAM

## 2025-06-25 PROCEDURE — 3008F BODY MASS INDEX DOCD: CPT | Performed by: STUDENT IN AN ORGANIZED HEALTH CARE EDUCATION/TRAINING PROGRAM

## 2025-06-25 PROCEDURE — 1036F TOBACCO NON-USER: CPT | Performed by: STUDENT IN AN ORGANIZED HEALTH CARE EDUCATION/TRAINING PROGRAM

## 2025-06-25 PROCEDURE — 3074F SYST BP LT 130 MM HG: CPT | Performed by: STUDENT IN AN ORGANIZED HEALTH CARE EDUCATION/TRAINING PROGRAM

## 2025-06-25 PROCEDURE — 3078F DIAST BP <80 MM HG: CPT | Performed by: STUDENT IN AN ORGANIZED HEALTH CARE EDUCATION/TRAINING PROGRAM

## 2025-06-25 PROCEDURE — 1159F MED LIST DOCD IN RCRD: CPT | Performed by: STUDENT IN AN ORGANIZED HEALTH CARE EDUCATION/TRAINING PROGRAM

## 2025-06-25 PROCEDURE — 4010F ACE/ARB THERAPY RXD/TAKEN: CPT | Performed by: STUDENT IN AN ORGANIZED HEALTH CARE EDUCATION/TRAINING PROGRAM

## 2025-06-25 RX ORDER — AMLODIPINE BESYLATE 10 MG/1
10 TABLET ORAL DAILY
Qty: 90 TABLET | Refills: 3 | Status: SHIPPED | OUTPATIENT
Start: 2025-06-25

## 2025-06-25 RX ORDER — METFORMIN HYDROCHLORIDE 500 MG/1
500 TABLET ORAL 2 TIMES DAILY
Qty: 180 TABLET | Refills: 3 | Status: SHIPPED | OUTPATIENT
Start: 2025-06-25

## 2025-06-25 RX ORDER — BLOOD SUGAR DIAGNOSTIC
STRIP MISCELLANEOUS
Qty: 200 STRIP | Refills: 11 | Status: SHIPPED | OUTPATIENT
Start: 2025-06-25

## 2025-06-25 RX ORDER — OLMESARTAN MEDOXOMIL 20 MG/1
20 TABLET ORAL DAILY
Qty: 90 TABLET | Refills: 3 | Status: SHIPPED | OUTPATIENT
Start: 2025-06-25

## 2025-06-25 RX ORDER — ALPRAZOLAM 0.5 MG/1
0.5 TABLET ORAL 3 TIMES DAILY PRN
Qty: 90 TABLET | Refills: 0 | Status: SHIPPED | OUTPATIENT
Start: 2025-06-25 | End: 2025-09-23

## 2025-06-25 ASSESSMENT — ENCOUNTER SYMPTOMS
DEPRESSION: 0
LOSS OF SENSATION IN FEET: 0
OCCASIONAL FEELINGS OF UNSTEADINESS: 0

## 2025-06-25 ASSESSMENT — COLUMBIA-SUICIDE SEVERITY RATING SCALE - C-SSRS
1. IN THE PAST MONTH, HAVE YOU WISHED YOU WERE DEAD OR WISHED YOU COULD GO TO SLEEP AND NOT WAKE UP?: NO
2. HAVE YOU ACTUALLY HAD ANY THOUGHTS OF KILLING YOURSELF?: NO
6. HAVE YOU EVER DONE ANYTHING, STARTED TO DO ANYTHING, OR PREPARED TO DO ANYTHING TO END YOUR LIFE?: NO

## 2025-06-25 NOTE — PROGRESS NOTES
64-year-old female presenting for follow-up on multiple concerns.  Mostly stable.    HTN  Stable, tolerating current regimen well.     DMII  Stable, tolerating current regimen well.  Following with [facility] pharmacist.  Blood sugar has been going up.  Does admit to eating sweets almost daily.     Anxiety  Stable on current regimen.  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    SocHx:   - Smoking: Never    12 point ROS reviewed and negative other than as stated in HPI      General: Alert, oriented, pleasant, in no acute distress  HEENT:      Head: normocephalic, atraumatic;      eyes: EOMI, no scleral icterus;   Neck: soft, supple, non-tender, no masses appreciated  CV: Heart with regular rate and rhythm, normal S1/S2, no murmurs  Lungs: CTAB without wheezing, rhonchi or rales; good respiratory effort, no increased work of breathing  Neuro: Cranial nerves grossly intact; alert and oriented  Psych: Appropriate mood and affect    #HTN  - At goal in office  -Continue olmesartan 20 mg daily  -Repeat CMP     #DMII  -Last A1c 3, 12/2024  -UTD diabetic eye exam  - Diabetic foot exam negative in office 12/2024  -Currently on metformin 1000 mg twice daily, Trulicity 4.5 mg weekly, Farxiga 10 mg daily  -Continue to follow with [facility] pharmacist  -Repeat A1c, if still elevated, likely will have to escalate treatment    #HLD  - Continue atorvastatin 40 mg daily  - Repeat lipid panel    #Anxiety  -Continue citalopram 20 mg daily, alprazolam 0.5 mg 3 times daily as needed  - CSA updated today, UDS ordered today     #R knee pain  -Plain film with moderate arthritis  - Continue to follow with orthopedics  -Physical therapy    #Low back pain  -Order plain film  - Physical therapy    F/U 6 months, sooner if indicated, Medicare at that time    Devyn  D'Amico, DO

## 2025-06-26 ENCOUNTER — TELEMEDICINE (OUTPATIENT)
Dept: PHARMACY | Facility: HOSPITAL | Age: 65
End: 2025-06-26
Payer: MEDICARE

## 2025-06-26 ENCOUNTER — TELEPHONE (OUTPATIENT)
Dept: PRIMARY CARE | Facility: CLINIC | Age: 65
End: 2025-06-26

## 2025-06-26 ENCOUNTER — PHARMACY VISIT (OUTPATIENT)
Dept: PHARMACY | Facility: CLINIC | Age: 65
End: 2025-06-26
Payer: MEDICARE

## 2025-06-26 DIAGNOSIS — E11.9 DIABETES MELLITUS TYPE 2 WITHOUT RETINOPATHY (MULTI): Primary | ICD-10-CM

## 2025-06-26 LAB
ALBUMIN SERPL-MCNC: 4.5 G/DL (ref 3.6–5.1)
ALP SERPL-CCNC: 62 U/L (ref 37–153)
ALT SERPL-CCNC: 35 U/L (ref 6–29)
ANION GAP SERPL CALCULATED.4IONS-SCNC: 12 MMOL/L (CALC) (ref 7–17)
AST SERPL-CCNC: 21 U/L (ref 10–35)
BILIRUB SERPL-MCNC: 0.5 MG/DL (ref 0.2–1.2)
BUN SERPL-MCNC: 23 MG/DL (ref 7–25)
CALCIUM SERPL-MCNC: 10 MG/DL (ref 8.6–10.4)
CHLORIDE SERPL-SCNC: 102 MMOL/L (ref 98–110)
CHOLEST SERPL-MCNC: 143 MG/DL
CHOLEST/HDLC SERPL: 2.9 (CALC)
CO2 SERPL-SCNC: 25 MMOL/L (ref 20–32)
CREAT SERPL-MCNC: 0.74 MG/DL (ref 0.5–1.05)
EGFRCR SERPLBLD CKD-EPI 2021: 89 ML/MIN/1.73M2
ERYTHROCYTE [DISTWIDTH] IN BLOOD BY AUTOMATED COUNT: 13.3 % (ref 11–15)
EST. AVERAGE GLUCOSE BLD GHB EST-MCNC: 203 MG/DL
EST. AVERAGE GLUCOSE BLD GHB EST-SCNC: 11.2 MMOL/L
GLUCOSE SERPL-MCNC: 177 MG/DL (ref 65–99)
HBA1C MFR BLD: 8.7 %
HCT VFR BLD AUTO: 42.9 % (ref 35–45)
HDLC SERPL-MCNC: 50 MG/DL
HGB BLD-MCNC: 13.2 G/DL (ref 11.7–15.5)
LDLC SERPL CALC-MCNC: 71 MG/DL (CALC)
MCH RBC QN AUTO: 28.6 PG (ref 27–33)
MCHC RBC AUTO-ENTMCNC: 30.8 G/DL (ref 32–36)
MCV RBC AUTO: 92.9 FL (ref 80–100)
NONHDLC SERPL-MCNC: 93 MG/DL (CALC)
PLATELET # BLD AUTO: 273 THOUSAND/UL (ref 140–400)
PMV BLD REES-ECKER: 13 FL (ref 7.5–12.5)
POTASSIUM SERPL-SCNC: 5 MMOL/L (ref 3.5–5.3)
PROT SERPL-MCNC: 7.1 G/DL (ref 6.1–8.1)
RBC # BLD AUTO: 4.62 MILLION/UL (ref 3.8–5.1)
SODIUM SERPL-SCNC: 139 MMOL/L (ref 135–146)
TRIGL SERPL-MCNC: 141 MG/DL
WBC # BLD AUTO: 9.5 THOUSAND/UL (ref 3.8–10.8)

## 2025-06-26 PROCEDURE — RXMED WILLOW AMBULATORY MEDICATION CHARGE

## 2025-06-26 RX ORDER — TIRZEPATIDE 10 MG/.5ML
10 INJECTION, SOLUTION SUBCUTANEOUS WEEKLY
Qty: 2 ML | Refills: 2 | Status: SHIPPED | OUTPATIENT
Start: 2025-06-26

## 2025-06-26 NOTE — PROGRESS NOTES
Pharmacist Clinic: Diabetes Management  Keith Fregoso is a 64 y.o. female was referred to Clinical Pharmacy Team for diabetes management.     Referring Provider: Christopher D'Amico, DO     HISTORY OF PRESENT ILLNESS  Approximate Date of Diagnosis: approx 20 years ago  Comborbidities: obesity, CKD  Diet: patient states she knows she does not eat healthy, she needs to work on it   Exercise Routine: minimal given her leg problems     LAB REVIEW   GLUCOSE (mg/dL)   Date Value   06/25/2025 177 (H)     Glucose (mg/dL)   Date Value   12/12/2024 166 (H)   06/14/2024 152 (H)   03/22/2024 139 (H)     HEMOGLOBIN A1c (%)   Date Value   06/25/2025 8.7 (H)     Hemoglobin A1C (%)   Date Value   12/12/2024 8.3 (H)   06/14/2024 7.6 (H)   03/22/2024 7.4 (H)     CARBON DIOXIDE (mmol/L)   Date Value   06/25/2025 25     Bicarbonate (mmol/L)   Date Value   12/12/2024 30   06/14/2024 25   03/22/2024 25     UREA NITROGEN (BUN) (mg/dL)   Date Value   06/25/2025 23     Urea Nitrogen (mg/dL)   Date Value   12/12/2024 21   06/14/2024 24 (H)   03/22/2024 23     Creatinine (mg/dL)   Date Value   12/12/2024 0.81   06/14/2024 0.66   03/22/2024 0.69     CREATININE (mg/dL)   Date Value   06/25/2025 0.74     Lab Results   Component Value Date    CHOL 143 06/25/2025    CHOL 142 12/12/2024    CHOL 163 06/14/2024     Lab Results   Component Value Date    HDL 50 06/25/2025    HDL 41.2 12/12/2024    HDL 53.0 06/14/2024     Lab Results   Component Value Date    LDLCALC 71 06/25/2025    LDLCALC 75 12/12/2024    LDLCALC 83 06/14/2024     Lab Results   Component Value Date    TRIG 141 06/25/2025    TRIG 130 12/12/2024    TRIG 133 06/14/2024     DIABETES ASSESSMENT    CURRENT PHARMACOTHERAPY  - metformin 500 mg twice daily   - mounjaro 7.5 mg once weekly     SECONDARY PREVENTION  - Statin? Yes  - ACE-I/ARB? Yes, patient appears to be non-adherent     SMBG MEASUREMENTS: 120-140    HISTORICAL PHARMACOTHERAPY   - jardiance: vaginal itchiness  - farxiga:  vaginal itchiness   - trulicity 4.5 mg under the skin once weekly: changed to mounjaro     DISCUSSION:  Last A1C came back at 8.7% (goal under 7%)   Patient is tolerating current medication regimen without issues   Discussed increasing mounjaro to 10 mg once weekly given our A1c is still above goal of 7%   Counseled patient, answered all questions and concerns   Discussed eating healthy, patient reports she has been having a lot of cookies and fruits   Advised we want to avoid things like this, recommended incorporating veggies and no/low sugar options     RECOMMENDATIONS/PLAN  1. Patients diabetes is worsening with most recent A1c of 8.7% (goal < 7 %).   - Increase mounjaro to 10 mg under the skin once weekly.  - Continue all meds under the continuation of care with the referring provider and clinical pharmacy team.    Clinical Pharmacist follow up: 1 month   PAP Approval: 8/22/2024    Thank you,  Alejandra Espinal, PharmD    Verbal consent to manage patient's drug therapy was obtained from the patient. They were informed they may decline to participate or withdraw from participation in pharmacy services at any time.

## 2025-06-26 NOTE — TELEPHONE ENCOUNTER
Result Communication    Resulted Orders   Lipid Panel   Result Value Ref Range    CHOLESTEROL, TOTAL 143 <200 mg/dL    HDL CHOLESTEROL 50 > OR = 50 mg/dL    TRIGLYCERIDES 141 <150 mg/dL    LDL-CHOLESTEROL 71 mg/dL (calc)      Comment:      Reference range: <100     Desirable range <100 mg/dL for primary prevention;    <70 mg/dL for patients with CHD or diabetic patients   with > or = 2 CHD risk factors.     LDL-C is now calculated using the Bladimir   calculation, which is a validated novel method providing   better accuracy than the Friedewald equation in the   estimation of LDL-C.   Qasim CARBALLO et al. GEN. 2013;310(19): 2498-4135   (http://education.[employer]/faq/YXG275)      CHOL/HDLC RATIO 2.9 <5.0 (calc)    NON HDL CHOLESTEROL 93 <130 mg/dL (calc)      Comment:      For patients with diabetes plus 1 major ASCVD risk   factor, treating to a non-HDL-C goal of <100 mg/dL   (LDL-C of <70 mg/dL) is considered a therapeutic   option.      Narrative    FASTING:YES  COLLECTION KIT GIVEN TO PATIENT. PATIENT ADVISED TO RETURN.    FASTING: YES   Comprehensive Metabolic Panel   Result Value Ref Range    GLUCOSE 177 (H) 65 - 99 mg/dL      Comment:                    Fasting reference interval     For someone without known diabetes, a glucose  value >125 mg/dL indicates that they may have  diabetes and this should be confirmed with a  follow-up test.         UREA NITROGEN (BUN) 23 7 - 25 mg/dL    CREATININE 0.74 0.50 - 1.05 mg/dL    EGFR 89 > OR = 60 mL/min/1.73m2    SODIUM 139 135 - 146 mmol/L    POTASSIUM 5.0 3.5 - 5.3 mmol/L    CHLORIDE 102 98 - 110 mmol/L    CARBON DIOXIDE 25 20 - 32 mmol/L    ELECTROLYTE BALANCE 12 7 - 17 mmol/L (calc)    CALCIUM 10.0 8.6 - 10.4 mg/dL    PROTEIN, TOTAL 7.1 6.1 - 8.1 g/dL    ALBUMIN 4.5 3.6 - 5.1 g/dL    BILIRUBIN, TOTAL 0.5 0.2 - 1.2 mg/dL    ALKALINE PHOSPHATASE 62 37 - 153 U/L    AST 21 10 - 35 U/L    ALT 35 (H) 6 - 29 U/L    Narrative    FASTING:YES  COLLECTION KIT  GIVEN TO PATIENT. PATIENT ADVISED TO RETURN.    FASTING: YES   CBC   Result Value Ref Range    WHITE BLOOD CELL COUNT 9.5 3.8 - 10.8 Thousand/uL    RED BLOOD CELL COUNT 4.62 3.80 - 5.10 Million/uL    HEMOGLOBIN 13.2 11.7 - 15.5 g/dL    HEMATOCRIT 42.9 35.0 - 45.0 %    MCV 92.9 80.0 - 100.0 fL    MCH 28.6 27.0 - 33.0 pg    MCHC 30.8 (L) 32.0 - 36.0 g/dL      Comment:      For adults, a slight decrease in the calculated MCHC  value (in the range of 30 to 32 g/dL) is most likely  not clinically significant; however, it should be  interpreted with caution in correlation with other  red cell parameters and the patient's clinical  condition.      RDW 13.3 11.0 - 15.0 %    PLATELET COUNT 273 140 - 400 Thousand/uL    MPV 13.0 (H) 7.5 - 12.5 fL    Narrative    FASTING:YES  COLLECTION KIT GIVEN TO PATIENT. PATIENT ADVISED TO RETURN.    FASTING: YES   Hemoglobin A1C   Result Value Ref Range    HEMOGLOBIN A1c 8.7 (H) <5.7 %      Comment:      For someone without known diabetes, a hemoglobin A1c  value of 6.5% or greater indicates that they may have   diabetes and this should be confirmed with a follow-up   test.     For someone with known diabetes, a value <7% indicates   that their diabetes is well controlled and a value   greater than or equal to 7% indicates suboptimal   control. A1c targets should be individualized based on   duration of diabetes, age, comorbid conditions, and   other considerations.     Currently, no consensus exists regarding use of  hemoglobin A1c for diagnosis of diabetes for children.          eAG (mg/dL) 203 mg/dL    eAG (mmol/L) 11.2 mmol/L    Narrative    FASTING:YES  COLLECTION KIT GIVEN TO PATIENT. PATIENT ADVISED TO RETURN.    FASTING: YES       12:23 PM      Results were successfully communicated with the patient and they acknowledged their understanding.

## 2025-06-26 NOTE — TELEPHONE ENCOUNTER
----- Message from Christopher D'Amico sent at 6/26/2025  9:12 AM EDT -----  Hemoglobin A1c at 8.7, much higher than we want it is.  Please continue to follow with Alejandra, will likely have to escalate medical therapy.    Borderline elevation in ALT, liver function test, most likely secondary to fatty liver, which was seen on ultrasound in 2022, need to work on reduction in sugar, weight loss.    Remaining labs unremarkable.  ----- Message -----  From: Mohini Rosado Results In  Sent: 6/26/2025   5:57 AM EDT  To: Christopher D'Amico,

## 2025-06-26 NOTE — RESULT ENCOUNTER NOTE
Hemoglobin A1c at 8.7, much higher than we want it is.  Please continue to follow with Alejandra, will likely have to escalate medical therapy.    Borderline elevation in ALT, liver function test, most likely secondary to fatty liver, which was seen on ultrasound in 2022, need to work on reduction in sugar, weight loss.    Remaining labs unremarkable.

## 2025-06-28 LAB
1OH-MIDAZOLAM UR-MCNC: NEGATIVE NG/ML
7AMINOCLONAZEPAM UR-MCNC: NEGATIVE NG/ML
A-OH ALPRAZ UR-MCNC: 33 NG/ML
A-OH-TRIAZOLAM UR-MCNC: NEGATIVE NG/ML
AMPHETAMINES UR QL: NEGATIVE NG/ML
BARBITURATES UR QL: NEGATIVE NG/ML
BENZODIAZ UR QL: POSITIVE NG/ML
BZE UR QL: NEGATIVE NG/ML
CREAT UR-MCNC: 63.1 MG/DL
DRUG SCREEN COMMENT UR-IMP: ABNORMAL
FENTANYL UR QL SCN: NEGATIVE NG/ML
LORAZEPAM UR-MCNC: NEGATIVE NG/ML
METHADONE UR QL: NEGATIVE NG/ML
NORDIAZEPAM UR-MCNC: NEGATIVE NG/ML
OH-ETHYLFLURAZ UR-MCNC: NEGATIVE NG/ML
OPIATES UR QL: NEGATIVE NG/ML
OXAZEPAM UR-MCNC: NEGATIVE NG/ML
OXIDANTS UR QL: NEGATIVE MCG/ML
OXYCODONE UR QL: NEGATIVE NG/ML
PCP UR QL: NEGATIVE NG/ML
PH UR: 7.5 [PH] (ref 4.5–9)
QUEST NOTES AND COMMENTS: ABNORMAL
TEMAZEPAM UR-MCNC: NEGATIVE NG/ML
THC UR QL: NEGATIVE NG/ML

## 2025-07-02 ENCOUNTER — APPOINTMENT (OUTPATIENT)
Dept: PHARMACY | Facility: HOSPITAL | Age: 65
End: 2025-07-02
Payer: MEDICARE

## 2025-07-21 ENCOUNTER — EVALUATION (OUTPATIENT)
Dept: PHYSICAL THERAPY | Facility: CLINIC | Age: 65
End: 2025-07-21
Payer: MEDICARE

## 2025-07-21 DIAGNOSIS — M54.50 LOW BACK PAIN: ICD-10-CM

## 2025-07-21 DIAGNOSIS — G89.29 CHRONIC PAIN OF RIGHT KNEE: Primary | ICD-10-CM

## 2025-07-21 DIAGNOSIS — M25.561 CHRONIC PAIN OF RIGHT KNEE: Primary | ICD-10-CM

## 2025-07-21 PROCEDURE — 97161 PT EVAL LOW COMPLEX 20 MIN: CPT | Mod: GP

## 2025-07-21 ASSESSMENT — ENCOUNTER SYMPTOMS: PAIN SCALE: 6

## 2025-07-21 ASSESSMENT — ACTIVITIES OF DAILY LIVING (ADL): POOR_BALANCE: 1

## 2025-07-21 NOTE — PROGRESS NOTES
"Physical Therapy Evaluation and Treatment     Patient Name: Keith Fregoso  MRN: [id number]  Encounter date: 2025  Time Calculation  Start Time: 1440  Stop Time: 1538  Time Calculation (min): 58 min  PT Evaluation Time Entry  PT Evaluation (Low) Time Entry: 58    Visit # 1 of 7  Visits/Dates Authorized: 2025 JUAN [employer]INA ADV (TS)/ AUTH REQUIRED/$30 COPAY/100% COVERAGE/$4150 OOP (NM)/PER AVAILITY Transaction ID [id number]     Current Problem:   Problem List Items Addressed This Visit    None  Visit Diagnoses         Codes      Chronic pain of right knee    -  Primary M25.561, G89.29    Relevant Orders    Follow Up In Physical Therapy      Low back pain     M54.50    Relevant Orders    Follow Up In Physical Therapy          Precautions: diabetic, does not read english, potential fall risk         PMHX: anxiety, \"disc in neck\"   Subjective Evaluation    History of Present Illness  Mechanism of injury: Pt reported that she has been having pain in her R knee for 2 years. She denied any LAURA. Pt stated that she was walking 2 miles, but the pain stops her now. She believes that pain developed in her LB due to her change in how she walks. Pain affects what she can do around her home. Pt denied paresthesia. She does mention a fall about 6 months ago due to missing a step. PT assisted pt with intake forms due to not being able to read english. Wears compression sleeve over R knee. She puts a pillow behind her knee at night for comfort    Pain  Current pain ratin  Location: L LB; R knee    Patient Goals  Patient goal: walk normal            Objective     Active Range of Motion     Lumbar   Flexion: Active lumbar flexion: 25-50% with pain  Extension: Active lumbar extension: 25%   Left lateral flexion: Active left lumbar lateral flexion: 50%   Right lateral flexion: Active right lumbar lateral flexion: <25%   Left Hip   Flexion: 95 degrees   Extension: WFL  Abduction: WFL    Right Hip   Flexion: Right hip active " flexion: 82 in gravity reduced position. with pain  Extension: WFL  Abduction: WFL  Left Knee   Flexion: 120 degrees   Extension: 0 degrees     Right Knee   Flexion: 98 degrees   Extension: 22 degrees     Passive Range of Motion     Right Hip   Flexion: with pain    Additional Passive Range of Motion Details  PROM of R hip flexion limited by pain     Strength/Myotome Testing     Left Hip   Planes of Motion   Flexion: 4  Extension: 4-  Abduction: 4    Right Hip   Planes of Motion   Flexion: 2-  Extension: 3+  Abduction: 4-    Left Knee   Flexion: 4-  Extension: 4    Right Knee   Flexion: 2-  Extension: 2-    Ambulation     Comments   R knee flexed with ambulation               Objective     Active Range of Motion     Lumbar   Flexion: Active lumbar flexion: 25-50% with pain  Extension: Active lumbar extension: 25%   Left lateral flexion: Active left lumbar lateral flexion: 50%   Right lateral flexion: Active right lumbar lateral flexion: <25%   Left Hip   Flexion: 95 degrees   Extension: WFL  Abduction: WFL    Right Hip   Flexion: Right hip active flexion: 82 in gravity reduced position. with pain  Extension: WFL  Abduction: WFL  Left Knee   Flexion: 120 degrees   Extension: 0 degrees     Right Knee   Flexion: 98 degrees   Extension: 22 degrees     Passive Range of Motion     Right Hip   Flexion: with pain    Additional Passive Range of Motion Details  PROM of R hip flexion limited by pain     Strength/Myotome Testing     Left Hip   Planes of Motion   Flexion: 4  Extension: 4-  Abduction: 4    Right Hip   Planes of Motion   Flexion: 2-  Extension: 3+  Abduction: 4-    Left Knee   Flexion: 4-  Extension: 4    Right Knee   Flexion: 2-  Extension: 2-    Ambulation     Comments   R knee flexed with ambulation        Objective     Active Range of Motion     Lumbar   Flexion: Active lumbar flexion: 25-50% with pain  Extension: Active lumbar extension: 25%   Left lateral flexion: Active left lumbar lateral flexion: 50%    Right lateral flexion: Active right lumbar lateral flexion: <25%   Left Hip   Flexion: 95 degrees   Extension: WFL  Abduction: WFL    Right Hip   Flexion: Right hip active flexion: 82 in gravity reduced position. with pain  Extension: WFL  Abduction: WFL  Left Knee   Flexion: 120 degrees   Extension: 0 degrees     Right Knee   Flexion: 98 degrees   Extension: 22 degrees     Passive Range of Motion     Right Hip   Flexion: with pain    Additional Passive Range of Motion Details  PROM of R hip flexion limited by pain     Strength/Myotome Testing     Left Hip   Planes of Motion   Flexion: 4  Extension: 4-  Abduction: 4    Right Hip   Planes of Motion   Flexion: 2-  Extension: 3+  Abduction: 4-    Left Knee   Flexion: 4-  Extension: 4    Right Knee   Flexion: 2-  Extension: 2-    Ambulation     Comments   R knee flexed with ambulation             SLS RLE: 7-8 seconds       Treatments:       HEP / Access Codes:   Access Code: [email or web address]  URL: [email or web address]  Date: 07/21/2025  Prepared by: Yoel Escobedo    Exercises  - Seated Quad Set  - 1-6 x daily - 7 x weekly - 3 sets - 10 reps  - Seated Long Arc Quad  - 1-6 x daily - 7 x weekly - 3 sets - 10 reps  - Seated Knee Extension Stretch with Chair  - 1-6 x daily - 7 x weekly - 3 sets - 10 reps - 5-10seconds hold  - Sitting Heel Slide with Towel  - 1-6 x daily - 7 x weekly - 3 sets - 10 reps - 5-10seconds hold    Assessment & Plan     Assessment  Impairments: abnormal gait, abnormal or restricted ROM, activity intolerance, impaired balance, impaired physical strength, lacks appropriate home exercise program and pain with function  Assessment details: Pt demonstrated limited R knee AROM and strength as above. Pain was noted in posterior knee and anterior knee. Pt said pain improved after performing HEP in clinic. R knee pain is consistent with arthritis. Lumbar and B hip AROM and strength limited as well. Recommend progressing strengthening and stretching as  tolerated, focusing on improving R knee AROM  Barriers to therapy: co-pay  Prognosis: good    Goals  walk normal    Plan  Therapy options: will be seen for skilled physical therapy services  Planned modality interventions: cryotherapy and thermotherapy (hydrocollator packs)  Planned therapy interventions: abdominal trunk stabilization, balance/weight-bearing training, body mechanics training, flexibility, functional ROM exercises, gait training, home exercise program, therapeutic activities, stretching, strengthening, postural training, neuromuscular re-education and manual therapy  Frequency: 1x week  Duration in visits: 6  Duration in weeks: 6  Treatment plan discussed with: patient  Plan details: Pt educated on HEP-given handout, POC, assessment, time taken to assist pt with intake form and ensuring understanding of HEP due to not being able to read english        Low complexity due to patient's clinical presentation being stable and uncomplicated by any significant comorbidities that may affect rehab tolerance and progression.     Post-Treatment Symptoms:   Reduced     Goals:   Active       PT Problem       PT Goal 1       Start:  07/21/25    Expected End:  08/11/25       Pt will be independent with HEP to ensure benefit outside of PT         PT Goal 2       Start:  07/21/25    Expected End:  09/01/25       Pt will improve AROM of R knee flexion to at least 110 degrees, R knee extension to at least 5 degrees or less, R hip flexion to 90 degrees against gravity to allow pt to be able to improve gait mechanics, tolerate prolonged ambulation    Pt will improve strength of R hip flex and R knee flex/ext to at least 3+/5 to allow pt to be able to tolerate prolonged ambulation, perform cleaning/cooking with less difficulty     Pt will report reduced pain to at least 2/10 or less to allow pt to be able to tolerate prolonged ambulation, perform cleaning/cooking with less difficulty     Pt will report being able to  ambulate for increased distances o allow pt to be able to tolerate prolonged ambulation, perform cleaning/cooking with less difficulty

## 2025-07-23 ENCOUNTER — APPOINTMENT (OUTPATIENT)
Dept: PHARMACY | Facility: HOSPITAL | Age: 65
End: 2025-07-23
Payer: MEDICARE

## 2025-07-23 DIAGNOSIS — E11.9 DIABETES MELLITUS TYPE 2 WITHOUT RETINOPATHY (MULTI): Primary | ICD-10-CM

## 2025-07-23 NOTE — PROGRESS NOTES
Pharmacist Clinic: Diabetes Management  Keith Fregoso is a 64 y.o. female was referred to Clinical Pharmacy Team for diabetes management.     Referring Provider: Christopher D'Amico, DO     HISTORY OF PRESENT ILLNESS  Approximate Date of Diagnosis: approx 20 years ago  Comborbidities: obesity, CKD  Diet: patient states she knows she does not eat healthy, she needs to work on it   Exercise Routine: minimal given her leg problems     LAB REVIEW   GLUCOSE (mg/dL)   Date Value   06/25/2025 177 (H)     Glucose (mg/dL)   Date Value   12/12/2024 166 (H)   06/14/2024 152 (H)   03/22/2024 139 (H)     HEMOGLOBIN A1c (%)   Date Value   06/25/2025 8.7 (H)     Hemoglobin A1C (%)   Date Value   12/12/2024 8.3 (H)   06/14/2024 7.6 (H)   03/22/2024 7.4 (H)     CARBON DIOXIDE (mmol/L)   Date Value   06/25/2025 25     Bicarbonate (mmol/L)   Date Value   12/12/2024 30   06/14/2024 25   03/22/2024 25     UREA NITROGEN (BUN) (mg/dL)   Date Value   06/25/2025 23     Urea Nitrogen (mg/dL)   Date Value   12/12/2024 21   06/14/2024 24 (H)   03/22/2024 23     Creatinine (mg/dL)   Date Value   12/12/2024 0.81   06/14/2024 0.66   03/22/2024 0.69     CREATININE (mg/dL)   Date Value   06/25/2025 0.74     Lab Results   Component Value Date    CHOL 143 06/25/2025    CHOL 142 12/12/2024    CHOL 163 06/14/2024     Lab Results   Component Value Date    HDL 50 06/25/2025    HDL 41.2 12/12/2024    HDL 53.0 06/14/2024     Lab Results   Component Value Date    LDLCALC 71 06/25/2025    LDLCALC 75 12/12/2024    LDLCALC 83 06/14/2024     Lab Results   Component Value Date    TRIG 141 06/25/2025    TRIG 130 12/12/2024    TRIG 133 06/14/2024     DIABETES ASSESSMENT    CURRENT PHARMACOTHERAPY  - metformin 500 mg twice daily   - mounjaro 10 mg once weekly     SECONDARY PREVENTION  - Statin? Yes  - ACE-I/ARB? Yes, patient appears to be non-adherent     SMBG MEASUREMENTS: 120-140    HISTORICAL PHARMACOTHERAPY   - jardiance: vaginal itchiness  - farxiga: vaginal  itchiness   - trulicity 4.5 mg under the skin once weekly: changed to mounjaro     DISCUSSION:  Last A1C came back at 8.7% (goal under 7%)   Following patients last A1c, we increased mounjaro to 10 mg once weekly  Patient is tolerating current medication regimen   Mounjaro:   Patient is tolerating the 10 mg dose   Discussed deferring a dose increase at this time as her appetite is minimal  Counseled patient, answered all questions and concerns     RECOMMENDATIONS/PLAN  1. Patients diabetes is worsening with most recent A1c of 8.7% (goal < 7 %). Since last A1c, we increased patients mounjaro to 10 mg.   - Continue all meds under the continuation of care with the referring provider and clinical pharmacy team.    Clinical Pharmacist follow up: 1 month  [facility] PAP Approval: 8/22/2024    Thank you,  Alejandra Espinal, PharmD    Verbal consent to manage patient's drug therapy was obtained from the patient. They were informed they may decline to participate or withdraw from participation in pharmacy services at any time.

## 2025-07-28 ENCOUNTER — TELEPHONE (OUTPATIENT)
Dept: PHYSICAL THERAPY | Facility: CLINIC | Age: 65
End: 2025-07-28

## 2025-07-28 ENCOUNTER — TELEPHONE (OUTPATIENT)
Dept: PHYSICAL THERAPY | Facility: CLINIC | Age: 65
End: 2025-07-28
Payer: MEDICARE

## 2025-07-28 NOTE — TELEPHONE ENCOUNTER
Called and left patient a message regarding calling us to  set up a few more PT follow up appointments

## 2025-07-30 ENCOUNTER — PHARMACY VISIT (OUTPATIENT)
Dept: PHARMACY | Facility: CLINIC | Age: 65
End: 2025-07-30
Payer: MEDICARE

## 2025-07-30 PROCEDURE — RXMED WILLOW AMBULATORY MEDICATION CHARGE

## 2025-07-31 ENCOUNTER — TREATMENT (OUTPATIENT)
Dept: PHYSICAL THERAPY | Facility: CLINIC | Age: 65
End: 2025-07-31
Payer: MEDICARE

## 2025-07-31 DIAGNOSIS — G89.29 CHRONIC PAIN OF RIGHT KNEE: ICD-10-CM

## 2025-07-31 DIAGNOSIS — M25.561 CHRONIC PAIN OF RIGHT KNEE: ICD-10-CM

## 2025-07-31 DIAGNOSIS — M54.50 LOW BACK PAIN: ICD-10-CM

## 2025-07-31 PROCEDURE — 97110 THERAPEUTIC EXERCISES: CPT | Mod: GP,CQ

## 2025-07-31 ASSESSMENT — PAIN SCALES - GENERAL: PAINLEVEL_OUTOF10: 9

## 2025-07-31 ASSESSMENT — PAIN DESCRIPTION - DESCRIPTORS: DESCRIPTORS: ACHING;SHARP

## 2025-07-31 NOTE — PROGRESS NOTES
"Physical Therapy Treatment    Patient Name: Keith Fregoso  MRN: [id number]  Today's Date: 7/31/2025  Time Calculation  Start Time: 1421  Stop Time: 1506  Time Calculation (min): 45 min  PT Therapeutic Procedures Time Entry  Therapeutic Exercise Time Entry: 41    Insurance:  Visit number: 2 of 7  Authorization info: 07/28/25 Auth Rcvd 5 visits 7/31-9/28 CPTs [id number]    07/17/2025 [employer] ADV (TS)/ AUTH REQUIRED/$30 COPAY/100% COVERAGE/$4150 OOP (NM)/PER AVAILITY Transaction ID [id number]    Insurance Type: Payor: [employer]ICARE / Plan: [employer] MEDICARE ADVANTAGE / Product Type: *No Product type* /     Current Problem   1. Chronic pain of right knee  Follow Up In Physical Therapy      2. Low back pain  Follow Up In Physical Therapy          Subjective   General   Reason for Referral: Chronic pain of right knee    -  Primary M25.561, G89.29     Relevant Orders    Follow Up In Physical Therapy      Low back pain     M54.50    Relevant Orders    Follow Up In Physical Therapy  Referred By: Christopher D'Amico,   General Comment: Pt reports very high R knee pain on arrival.  Precautions:  Precautions  Precautions Comment: diabetic, does not read english, potential fall risk   PMHX: anxiety, \"disc in neck\"  Pain   0-10 (Numeric) Pain Score: 9  Pain Type: Chronic pain  Pain Location: Knee  Pain Orientation: Right  Pain Descriptors: Aching, Sharp  Post Treatment Pain Level 8/10    Objective   AROM R knee after ther ex  degrees.    Treatments:  Therapeutic Exercise:  Therapeutic Exercise  Therapeutic Exercise Performed: Yes  Therapeutic Exercise Activity 1: Nustep L4, 5'  Therapeutic Exercise Activity 2: Standing PF 2x10  Therapeutic Exercise Activity 3: Standing DF 2x10  Therapeutic Exercise Activity 4: Gastroc stretch 30\"x3 wedge  Therapeutic Exercise Activity 5: LAQs 2# 2x10  Therapeutic Exercise Activity 6: Standing hamstring curls 2#  Therapeutic Exercise Activity 7: Quad sets with towel " "bolster 5\" hold 2x10  Therapeutic Exercise Activity 8: Heel slides 5\" hold  Therapeutic Exercise Activity 9: Hip adduction 5\" hold 2x10  Therapeutic Exercise Activity 10: Seated hamstring stretch     Assessment   Assessment:   PT Assessment  Rehab Prognosis: Good  Evaluation/Treatment Tolerance: Patient tolerated treatment well  Assessment Comment: Pt demonstrates improved AROM as noted, tolerates ther ex progressions with mild reduction of R knee pain level.    Plan:   OP PT Plan  Treatment/Interventions: Other (comment) (Continuie to progress R knee AROM, L LE strengthening as able.)  PT Plan: Skilled PT  Rehab Potential: Good    OP EDUCATION:  Outpatient Education  Individual(s) Educated: Patient  Education Provided: Home Exercise Program, Body Mechanics, Anatomy  Patient/Caregiver Demonstrated Understanding: yes  Patient Response to Education: Patient/Caregiver Asked Appropriate Questions, Patient/Caregiver Performed Return Demonstration of Exercises/Activities, Patient/Caregiver Verbalized Understanding of Information    Goals:   Active       PT Problem       PT Goal 1       Start:  07/21/25    Expected End:  08/11/25       Pt will be independent with HEP to ensure benefit outside of PT         PT Goal 2       Start:  07/21/25    Expected End:  09/01/25       Pt will improve AROM of R knee flexion to at least 110 degrees, R knee extension to at least 5 degrees or less, R hip flexion to 90 degrees against gravity to allow pt to be able to improve gait mechanics, tolerate prolonged ambulation    Pt will improve strength of R hip flex and R knee flex/ext to at least 3+/5 to allow pt to be able to tolerate prolonged ambulation, perform cleaning/cooking with less difficulty     Pt will report reduced pain to at least 2/10 or less to allow pt to be able to tolerate prolonged ambulation, perform cleaning/cooking with less difficulty     Pt will report being able to ambulate for increased distances o allow pt to be " able to tolerate prolonged ambulation, perform cleaning/cooking with less difficulty

## 2025-08-11 ENCOUNTER — TREATMENT (OUTPATIENT)
Dept: PHYSICAL THERAPY | Facility: CLINIC | Age: 65
End: 2025-08-11
Payer: MEDICARE

## 2025-08-11 DIAGNOSIS — M25.561 CHRONIC PAIN OF RIGHT KNEE: ICD-10-CM

## 2025-08-11 DIAGNOSIS — M54.50 LOW BACK PAIN: ICD-10-CM

## 2025-08-11 DIAGNOSIS — G89.29 CHRONIC PAIN OF RIGHT KNEE: ICD-10-CM

## 2025-08-11 PROCEDURE — 97110 THERAPEUTIC EXERCISES: CPT | Mod: GP

## 2025-08-12 ENCOUNTER — APPOINTMENT (OUTPATIENT)
Dept: PHARMACY | Facility: HOSPITAL | Age: 65
End: 2025-08-12
Payer: MEDICARE

## 2025-08-13 ENCOUNTER — CLINICAL SUPPORT (OUTPATIENT)
Dept: PHARMACY | Facility: HOSPITAL | Age: 65
End: 2025-08-13
Payer: MEDICARE

## 2025-08-13 DIAGNOSIS — E11.9 DIABETES MELLITUS TYPE 2 WITHOUT RETINOPATHY (MULTI): ICD-10-CM

## 2025-08-13 RX ORDER — TIRZEPATIDE 10 MG/.5ML
10 INJECTION, SOLUTION SUBCUTANEOUS WEEKLY
Qty: 2 ML | Refills: 3 | Status: SHIPPED | OUTPATIENT
Start: 2025-08-13

## 2025-08-20 PROCEDURE — RXMED WILLOW AMBULATORY MEDICATION CHARGE

## 2025-08-21 ENCOUNTER — PHARMACY VISIT (OUTPATIENT)
Dept: PHARMACY | Facility: CLINIC | Age: 65
End: 2025-08-21
Payer: MEDICARE

## 2025-08-22 ENCOUNTER — APPOINTMENT (OUTPATIENT)
Dept: PHYSICAL THERAPY | Facility: CLINIC | Age: 65
End: 2025-08-22
Payer: MEDICARE

## 2025-08-26 ENCOUNTER — TREATMENT (OUTPATIENT)
Dept: PHYSICAL THERAPY | Facility: CLINIC | Age: 65
End: 2025-08-26
Payer: MEDICARE

## 2025-08-26 DIAGNOSIS — M54.50 LOW BACK PAIN: ICD-10-CM

## 2025-08-26 DIAGNOSIS — M25.561 CHRONIC PAIN OF RIGHT KNEE: ICD-10-CM

## 2025-08-26 DIAGNOSIS — G89.29 CHRONIC PAIN OF RIGHT KNEE: ICD-10-CM

## 2025-08-26 PROCEDURE — 97110 THERAPEUTIC EXERCISES: CPT | Mod: GP,CQ

## 2025-09-05 ENCOUNTER — TREATMENT (OUTPATIENT)
Dept: PHYSICAL THERAPY | Facility: CLINIC | Age: 65
End: 2025-09-05
Payer: MEDICARE

## 2025-09-05 DIAGNOSIS — M54.50 LOW BACK PAIN: ICD-10-CM

## 2025-09-05 DIAGNOSIS — G89.29 CHRONIC PAIN OF RIGHT KNEE: ICD-10-CM

## 2025-09-05 DIAGNOSIS — M25.561 CHRONIC PAIN OF RIGHT KNEE: ICD-10-CM

## 2025-09-05 PROCEDURE — 97110 THERAPEUTIC EXERCISES: CPT | Mod: GP,CQ

## 2025-12-11 ENCOUNTER — APPOINTMENT (OUTPATIENT)
Dept: PRIMARY CARE | Facility: CLINIC | Age: 65
End: 2025-12-11
Payer: MEDICARE

## 2025-12-15 ENCOUNTER — APPOINTMENT (OUTPATIENT)
Dept: PHARMACY | Facility: HOSPITAL | Age: 65
End: 2025-12-15
Payer: MEDICARE

## 2025-12-17 ENCOUNTER — APPOINTMENT (OUTPATIENT)
Dept: PRIMARY CARE | Facility: CLINIC | Age: 65
End: 2025-12-17
Payer: MEDICARE